# Patient Record
Sex: FEMALE | Race: BLACK OR AFRICAN AMERICAN | Employment: FULL TIME | URBAN - METROPOLITAN AREA
[De-identification: names, ages, dates, MRNs, and addresses within clinical notes are randomized per-mention and may not be internally consistent; named-entity substitution may affect disease eponyms.]

---

## 2017-12-04 ENCOUNTER — HOSPITAL ENCOUNTER (EMERGENCY)
Age: 27
Discharge: HOME | End: 2017-12-04
Attending: EMERGENCY MEDICINE
Payer: COMMERCIAL

## 2017-12-04 VITALS
OXYGEN SATURATION: 100 % | RESPIRATION RATE: 20 BRPM | HEIGHT: 67 IN | DIASTOLIC BLOOD PRESSURE: 72 MMHG | BODY MASS INDEX: 21.19 KG/M2 | WEIGHT: 135 LBS | HEART RATE: 94 BPM | SYSTOLIC BLOOD PRESSURE: 93 MMHG | TEMPERATURE: 98.6 F

## 2017-12-04 DIAGNOSIS — G43.709 CHRONIC MIGRAINE WITHOUT AURA WITHOUT STATUS MIGRAINOSUS, NOT INTRACTABLE: Primary | ICD-10-CM

## 2017-12-04 PROCEDURE — 99283 EMERGENCY DEPT VISIT LOW MDM: CPT

## 2017-12-04 PROCEDURE — 74011250637 HC RX REV CODE- 250/637: Performed by: EMERGENCY MEDICINE

## 2017-12-04 RX ORDER — PROMETHAZINE HYDROCHLORIDE 25 MG/1
25 TABLET ORAL
Status: COMPLETED | OUTPATIENT
Start: 2017-12-04 | End: 2017-12-04

## 2017-12-04 RX ORDER — ONDANSETRON 4 MG/1
4 TABLET, ORALLY DISINTEGRATING ORAL
Status: COMPLETED | OUTPATIENT
Start: 2017-12-04 | End: 2017-12-04

## 2017-12-04 RX ORDER — BUTALBITAL, ACETAMINOPHEN AND CAFFEINE 300; 40; 50 MG/1; MG/1; MG/1
1 CAPSULE ORAL
Qty: 20 CAP | Refills: 0 | Status: SHIPPED | OUTPATIENT
Start: 2017-12-04 | End: 2018-01-05

## 2017-12-04 RX ORDER — PROMETHAZINE HYDROCHLORIDE 25 MG/1
25 TABLET ORAL
Qty: 12 TAB | Refills: 0 | Status: SHIPPED | OUTPATIENT
Start: 2017-12-04 | End: 2018-01-05

## 2017-12-04 RX ORDER — BUTALBITAL, ACETAMINOPHEN AND CAFFEINE 50; 325; 40 MG/1; MG/1; MG/1
1 TABLET ORAL
Status: COMPLETED | OUTPATIENT
Start: 2017-12-04 | End: 2017-12-04

## 2017-12-04 RX ORDER — ONDANSETRON 4 MG/1
4 TABLET, ORALLY DISINTEGRATING ORAL
Qty: 10 TAB | Refills: 0 | Status: SHIPPED | OUTPATIENT
Start: 2017-12-04 | End: 2018-01-05

## 2017-12-04 RX ADMIN — ONDANSETRON 4 MG: 4 TABLET, ORALLY DISINTEGRATING ORAL at 21:34

## 2017-12-04 RX ADMIN — PROMETHAZINE HYDROCHLORIDE 25 MG: 25 TABLET ORAL at 22:05

## 2017-12-04 RX ADMIN — BUTALBITAL, ACETAMINOPHEN, AND CAFFEINE 1 TABLET: 50; 325; 40 TABLET ORAL at 21:34

## 2017-12-05 NOTE — ED NOTES
Pt presents ambulatory to ED complaining of migraine and nausea. Pt reports hx of migraines and previous rx for Fioricet. Pt is alert and oriented x 4, RR even and unlabored, skin is warm and dry. Assesment completed and pt updated on plan of care. Emergency Department Nursing Plan of Care       The Nursing Plan of Care is developed from the Nursing assessment and Emergency Department Attending provider initial evaluation. The plan of care may be reviewed in the ED Provider note.     The Plan of Care was developed with the following considerations:   Patient / Family readiness to learn indicated by:verbalized understanding  Persons(s) to be included in education: patient  Barriers to Learning/Limitations:No    Signed     Jane Kessler RN    12/4/2017   9:37 PM

## 2017-12-05 NOTE — ED PROVIDER NOTES
EMERGENCY DEPARTMENT HISTORY AND PHYSICAL EXAM      Date: 12/4/2017  Patient Name: Garrison Weinstein    History of Presenting Illness     Chief Complaint   Patient presents with    Headache     Headache x 1 day. Patient says she took aleve and excedrin but recieved no relief. History Provided By: Patient    HPI: Garrison Weinstein, 27 y.o. female with PMHx significant for migraines, presents ambulatory to the ED with cc of severe HA since last night. She also reports decreased appetite, nausea and photophobia. Current HA is similar to previous migraines. Pt notes that she used to have a prescription for Fioricet but ran out ~ 1 year ago. She has tried taking aleve without relief. She reports an allergy to amoxicillin. She denies chance of pregnancy and notes her LMP was ~ 1 week ago. She denies any fevers, chills, vomiting, diarrhea, CP, SOB, vision changes or hearing changes. PCP: Srini San MD    There are no other complaints, changes, or physical findings at this time. Current Facility-Administered Medications   Medication Dose Route Frequency Provider Last Rate Last Dose    butalbital-acetaminophen-caffeine (FIORICET, ESGIC) -40 mg per tablet 1 Tab  1 Tab Oral NOW Terry Rosado MD        ondansetron (ZOFRAN ODT) tablet 4 mg  4 mg Oral NOW Terry Rosado MD           Past History     Past Medical History:  Past Medical History:   Diagnosis Date    Common migraine 4/14       Past Surgical History:  No past surgical history on file. Family History:  Family History   Problem Relation Age of Onset    Hypertension Mother     Thyroid Disease Mother     Diabetes Maternal Grandmother        Social History:  Social History   Substance Use Topics    Smoking status: Never Smoker    Smokeless tobacco: Not on file    Alcohol use Yes      Comment: 2/wine       Allergies:   Allergies   Allergen Reactions    Amoxicillin Hives         Review of Systems   Review of Systems Constitutional: Negative. Negative for activity change, chills and diaphoresis. HENT: Negative. Negative for ear pain, trouble swallowing and voice change. Eyes: Positive for photophobia. Respiratory: Negative for chest tightness and shortness of breath. Cardiovascular: Negative for chest pain, palpitations and leg swelling. Gastrointestinal: Positive for nausea. Negative for constipation and vomiting. Endocrine: Negative. Genitourinary: Negative. Negative for flank pain, frequency and hematuria. Musculoskeletal: Negative. Skin: Negative. Allergic/Immunologic: Negative. Neurological: Positive for headaches. Hematological: Negative. Psychiatric/Behavioral: Negative. All other systems reviewed and are negative. Physical Exam   Physical Exam   Constitutional: She is oriented to person, place, and time. She appears well-developed and well-nourished. HENT:   Head: Normocephalic. Mouth/Throat: Oropharynx is clear and moist.   Eyes: Conjunctivae and EOM are normal. Pupils are equal, round, and reactive to light. Neck: Normal range of motion. Neck supple. Cardiovascular: Normal rate, regular rhythm, normal heart sounds and intact distal pulses. Pulmonary/Chest: Effort normal and breath sounds normal.   Abdominal: Soft. Bowel sounds are normal. She exhibits no distension. There is no rebound. Musculoskeletal: Normal range of motion. She exhibits no edema or deformity. Neurological: She is alert and oriented to person, place, and time. Skin: Skin is warm and dry. Psychiatric: She has a normal mood and affect. Her behavior is normal. Judgment and thought content normal.           Medical Decision Making   I am the first provider for this patient. I reviewed the vital signs, available nursing notes, past medical history, past surgical history, family history and social history. Vital Signs-Reviewed the patient's vital signs.   Patient Vitals for the past 12 hrs:   Temp Pulse Resp BP SpO2   12/04/17 2115 98.6 °F (37 °C) 94 20 93/72 100 %       Pulse Oximetry Analysis - 100% on RA        Records Reviewed: Nursing Notes and Old Medical Records    Provider Notes (Medical Decision Making):     DDx: migraine HA, tension HA, sinus HA    ED Course:   Initial assessment performed. The patients presenting problems have been discussed, and they are in agreement with the care plan formulated and outlined with them. I have encouraged them to ask questions as they arise throughout their visit. 10:27 PM  Pt is feeling better, will discharge. Disposition:    Discharge Note:  10:30 PM  The pt is ready for discharge. The pt's signs, symptoms, diagnosis, and discharge instructions have been discussed and pt has conveyed their understanding. The pt is to follow up as recommended or return to ER should their symptoms worsen. Plan has been discussed and pt is in agreement. This note is prepared by Ralph Alanis, acting as a Scribe for James Molina MD.    James Molina MD: The scribe's documentation has been prepared under my direction and personally reviewed by me in its entirety. I confirm that the notes above accurately reflects all work, treatment, procedures, and medical decision making performed by me. PLAN:  1. Current Discharge Medication List      START taking these medications    Details   butalbital-acetaminophen-caff (FIORICET) -40 mg per capsule Take 1 Cap by mouth every four (4) hours as needed for Pain. Max Daily Amount: 6 Caps. Qty: 20 Cap, Refills: 0      ondansetron (ZOFRAN ODT) 4 mg disintegrating tablet Take 1 Tab by mouth every eight (8) hours as needed for Nausea. Qty: 10 Tab, Refills: 0      promethazine (PHENERGAN) 25 mg tablet Take 1 Tab by mouth every six (6) hours as needed for Nausea. Qty: 12 Tab, Refills: 0           2.    Follow-up Information     Follow up With Details Comments [address] MD Call  [phone]  [phone]      [facility] [place] EMERGENCY DEPT  As needed, If symptoms worsen [address]        Return to ED if worse     Diagnosis     Clinical Impression:   1. Chronic migraine without aura without status migrainosus, not intractable        Attestations:    Attestations: This note is prepared by Ralph Alanis, acting as Scribe for MD James Duenas MD: The scribe's documentation has been prepared under my direction and personally reviewed by me in its entirety. I confirm that the note above accurately reflects all work, treatment, procedures, and medical decision making performed by me.

## 2017-12-05 NOTE — ED NOTES
Discharge instructions were given to the patient by Juma Brooks RN. The patient left the Emergency Department ambulatory, alert and oriented and in no acute distress with 3 prescriptions. The patient was encouraged to call or return to the ED for worsening issues or problems and was encouraged to schedule a follow up appointment for continuing care. The patient verbalized understanding of discharge instructions and prescriptions, all questions were answered. The patient has no further concerns at this time.

## 2017-12-05 NOTE — DISCHARGE INSTRUCTIONS
Migraine Headache: Care Instructions  Your Care Instructions  Migraines are painful, throbbing headaches that often start on one side of the head. They may cause nausea and vomiting and make you sensitive to light, sound, or smell. Without treatment, migraines can last from 4 hours to a few days. Medicines can help prevent migraines or stop them after they have started. Your doctor can help you find which ones work best for you. Follow-up care is a key part of your treatment and safety. Be sure to make and go to all appointments, and call your doctor if you are having problems. It's also a good idea to know your test results and keep a list of the medicines you take. How can you care for yourself at home? · Do not drive if you have taken a prescription pain medicine. · Rest in a quiet, dark room until your headache is gone. Close your eyes, and try to relax or go to sleep. Don't watch TV or read. · Put a cold, moist cloth or cold pack on the painful area for 10 to 20 minutes at a time. Put a thin cloth between the cold pack and your skin. · Use a warm, moist towel or a heating pad set on low to relax tight shoulder and neck muscles. · Have someone gently massage your neck and shoulders. · Take your medicines exactly as prescribed. Call your doctor if you think you are having a problem with your medicine. You will get more details on the specific medicines your doctor prescribes. · Be careful not to take pain medicine more often than the instructions allow. You could get worse or more frequent headaches when the medicine wears off. To prevent migraines  · Keep a headache diary so you can figure out what triggers your headaches. Avoiding triggers may help you prevent headaches. Record when each headache began, how long it lasted, and what the pain was like.  (Was it throbbing, aching, stabbing, or dull?) Write down any other symptoms you had with the headache, such as nausea, flashing lights or dark spots, or sensitivity to bright light or loud noise. Note if the headache occurred near your period. List anything that might have triggered the headache. Triggers may include certain foods (chocolate, cheese, wine) or odors, smoke, bright light, stress, or lack of sleep. · If your doctor has prescribed medicine for your migraines, take it as directed. You may have medicine that you take only when you get a migraine and medicine that you take all the time to help prevent migraines. ¨ If your doctor has prescribed medicine for when you get a headache, take it at the first sign of a migraine, unless your doctor has given you other instructions. ¨ If your doctor has prescribed medicine to prevent migraines, take it exactly as prescribed. Call your doctor if you think you are having a problem with your medicine. · Find healthy ways to deal with stress. Migraines are most common during or right after stressful times. Take time to relax before and after you do something that has caused a migraine in the past.  · Try to keep your muscles relaxed by keeping good posture. Check your jaw, face, neck, and shoulder muscles for tension. Try to relax them. When you sit at a desk, change positions often. And make sure to stretch for 30 seconds each hour. · Get plenty of sleep and exercise. · Eat meals on a regular schedule. Avoid foods and drinks that often trigger migraines. These include chocolate, alcohol (especially red wine and port), aspartame, monosodium glutamate (MSG), and some additives found in foods (such as hot dogs, cantu, cold cuts, aged cheeses, and pickled foods). · Limit caffeine. Don't drink too much coffee, tea, or soda. But don't quit caffeine suddenly. That can also give you migraines. · Do not smoke or allow others to smoke around you. If you need help quitting, talk to your doctor about stop-smoking programs and medicines. These can increase your chances of quitting for good.   · If you are taking birth control pills or hormone therapy, talk to your doctor about whether they are triggering your migraines. When should you call for help? Call 911 anytime you think you may need emergency care. For example, call if:  ? · You have signs of a stroke. These may include:  ¨ Sudden numbness, paralysis, or weakness in your face, arm, or leg, especially on only one side of your body. ¨ Sudden vision changes. ¨ Sudden trouble speaking. ¨ Sudden confusion or trouble understanding simple statements. ¨ Sudden problems with walking or balance. ¨ A sudden, severe headache that is different from past headaches. ?Call your doctor now or seek immediate medical care if:  ? · You have new or worse nausea and vomiting. ? · You have a new or higher fever. ? · Your headache gets much worse. ? Watch closely for changes in your health, and be sure to contact your doctor if:  ? · You are not getting better after 2 days (48 hours). Where can you learn more? Go to http://nelsy-ivonne.info/. Enter C700 in the search box to learn more about \"Migraine Headache: Care Instructions. \"  Current as of: October 14, 2016  Content Version: 11.4  © 7623-4840 [employer]. Care instructions adapted under license by [employer] (which disclaims liability or warranty for this information). If you have questions about a medical condition or this instruction, always ask your healthcare professional. [place] [address] any warranty or liability for your use of this information.

## 2018-01-05 ENCOUNTER — OFFICE VISIT (OUTPATIENT)
Dept: NEUROLOGY | Age: 28
End: 2018-01-05

## 2018-01-05 VITALS
WEIGHT: 142 LBS | DIASTOLIC BLOOD PRESSURE: 68 MMHG | BODY MASS INDEX: 22.29 KG/M2 | RESPIRATION RATE: 20 BRPM | SYSTOLIC BLOOD PRESSURE: 98 MMHG | HEIGHT: 67 IN

## 2018-01-05 DIAGNOSIS — G43.001 MIGRAINE WITHOUT AURA AND WITH STATUS MIGRAINOSUS, NOT INTRACTABLE: Primary | ICD-10-CM

## 2018-01-05 RX ORDER — SUMATRIPTAN AND NAPROXEN SODIUM 85; 500 MG/1; MG/1
TABLET, FILM COATED ORAL
Qty: 2 TAB | Refills: 0 | Status: CANCELLED | COMMUNITY
Start: 2018-01-05

## 2018-01-05 RX ORDER — NAPROXEN SODIUM 220 MG
220 TABLET ORAL 2 TIMES DAILY WITH MEALS
COMMUNITY

## 2018-01-05 RX ORDER — SUMATRIPTAN AND NAPROXEN SODIUM 85; 500 MG/1; MG/1
TABLET, FILM COATED ORAL
Qty: 9 TAB | Refills: 3 | Status: SHIPPED | OUTPATIENT
Start: 2018-01-05 | End: 2021-11-08

## 2018-01-05 NOTE — PATIENT INSTRUCTIONS
Learning About Living Tiki  What is a living will? A living will is a legal form you use to write down the kind of care you want at the end of your life. It is used by the health professionals who will treat you if you aren't able to decide for yourself. If you put your wishes in writing, your loved ones and others will know what kind of care you want. They won't need to guess. This can ease your mind and be helpful to others. A living will is not the same as an estate or property will. An estate will explains what you want to happen with your money and property after you die. Is a living will a legal document? A living will is a legal document. Each state has its own laws about living falk. If you move to another state, make sure that your living will is legal in the state where you now live. Or you might use a universal form that has been approved by many states. This kind of form can sometimes be completed and stored online. Your electronic copy will then be available wherever you have a connection to the Internet. In most cases, doctors will respect your wishes even if you have a form from a different state. · You don't need an  to complete a living will. But legal advice can be helpful if your state's laws are unclear, your health history is complicated, or your family can't agree on what should be in your living will. · You can change your living will at any time. Some people find that their wishes about end-of-life care change as their health changes. · In addition to making a living will, think about completing a medical power of  form. This form lets you name the person you want to make end-of-life treatment decisions for you (your \"health care agent\") if you're not able to. Many hospitals and nursing homes will give you the forms you need to complete a living will and a medical power of .   · Your living will is used only if you can't make or communicate decisions for yourself anymore. If you become able to make decisions again, you can accept or refuse any treatment, no matter what you wrote in your living will. · Your state may offer an online registry. This is a place where you can store your living will online so the doctors and nurses who need to treat you can find it right away. What should you think about when creating a living will? Talk about your end-of-life wishes with your family members and your doctor. Let them know what you want. That way the people making decisions for you won't be surprised by your choices. Think about these questions as you make your living will:  · Do you know enough about life support methods that might be used? If not, talk to your doctor so you know what might be done if you can't breathe on your own, your heart stops, or you're unable to swallow. · What things would you still want to be able to do after you receive life-support methods? Would you want to be able to walk? To speak? To eat on your own? To live without the help of machines? · If you have a choice, where do you want to be cared for? In your home? At a hospital or nursing home? · Do you want certain Anglican practices performed if you become very ill? · If you have a choice at the end of your life, where would you prefer to die? At home? In a hospital or nursing home? Somewhere else? · Would you prefer to be buried or cremated? · Do you want your organs to be donated after you die? What should you do with your living will? · Make sure that your family members and your health care agent have copies of your living will. · Give your doctor a copy of your living will to keep in your medical record. If you have more than one doctor, make sure that each one has a copy. · You may want to put a copy of your living will where it can be easily found. Where can you learn more? Go to http://nelsy-ivonne.info/.   Enter X946 in the search box to learn more about \"Learning About Living Radha Hughes. \"  Current as of: September 24, 2016  Content Version: 11.4  © 1146-0506 [employer]. Care instructions adapted under license by [employer] (which disclaims liability or warranty for this information). If you have questions about a medical condition or this instruction, always ask your healthcare professional. [address] any warranty or liability for your use of this information. Advance Directives: Care Instructions  Your Care Instructions  An advance directive is a legal way to state your wishes at the end of your life. It tells your family and your doctor what to do if you can no longer say what you want. There are two main types of advance directives. You can change them any time that your wishes change. · A living will tells your family and your doctor your wishes about life support and other treatment. · A durable power of  for health care lets you name a person to make treatment decisions for you when you can't speak for yourself. This person is called a health care agent. If you do not have an advance directive, decisions about your medical care may be made by a doctor or a  who doesn't know you. It may help to think of an advance directive as a gift to the people who care for you. If you have one, they won't have to make tough decisions by themselves. Follow-up care is a key part of your treatment and safety. Be sure to make and go to all appointments, and call your doctor if you are having problems. It's also a good idea to know your test results and keep a list of the medicines you take. How can you care for yourself at home? · Discuss your wishes with your loved ones and your doctor. This way, there are no surprises. · Many states have a unique form. Or you might use a universal form that has been approved by many states. This kind of form can sometimes be completed and stored online.  Your electronic copy will then be available wherever you have a connection to the Internet. In most cases, doctors will respect your wishes even if you have a form from a different state. · You don't need a  to do an advance directive. But you may want to get legal advice. · Think about these questions when you prepare an advance directive:  ¨ Who do you want to make decisions about your medical care if you are not able to? Many people choose a family member or close friend. ¨ Do you know enough about life support methods that might be used? If not, talk to your doctor so you understand. ¨ What are you most afraid of that might happen? You might be afraid of having pain, losing your independence, or being kept alive by machines. ¨ Where would you prefer to die? Choices include your home, a hospital, or a nursing home. ¨ Would you like to have information about hospice care to support you and your family? ¨ Do you want to donate organs when you die? ¨ Do you want certain Anabaptist practices performed before you die? If so, put your wishes in the advance directive. · Read your advance directive every year, and make changes as needed. When should you call for help? Be sure to contact your doctor if you have any questions. Where can you learn more? Go to http://nelsy-ivonne.info/. Enter R264 in the search box to learn more about \"Advance Directives: Care Instructions. \"  Current as of: September 24, 2016  Content Version: 11.4  © 6663-3280 [employer]. Care instructions adapted under license by [employer] (which disclaims liability or warranty for this information). If you have questions about a medical condition or this instruction, always ask your healthcare professional. [place] [address] any warranty or liability for your use of this information.   [facility]   Statement to Patients  April 1, 2014      In an effort to ensure the large volume of patient prescription refills is processed in the most efficient and expeditious manner, we are asking our patients to assist us by calling your Pharmacy for all prescription refills, this will include also your  Mail Order Pharmacy. The pharmacy will contact our office electronically to continue the refill process. Please do not wait until the last minute to call your pharmacy. We need at least 48 hours (2days) to fill prescriptions. We also encourage you to call your pharmacy before going to  your prescription to make sure it is ready. With regard to controlled substance prescription refill requests (narcotic refills) that need to be picked up at our office, we ask your cooperation by providing us with at least 72 hours (3days) notice that you will need a refill. We will not refill narcotic prescription refill requests after 4:00pm on any weekday, Monday through Thursday, or after 2:00pm on Fridays, or on the weekends. We encourage everyone to explore another way of getting your prescription refill request processed using [employer], our patient web portal through our electronic medical record system. [employer] is an efficient and effective way to communicate your medication request directly to the office and  downloadable as an trever on your smart phone . [employer] also features a review functionality that allows you to view your medication list as well as leave messages for your physician. Are you ready to get connected? If so please review the attatched instructions or speak to any of our staff to get you set up right away! Thank you so much for your cooperation. Should you have any questions please contact our Practice Administrator. The Physicians and Staff,  [facility]       Track headaches on a calendar and bring to each appointment. Use Treximet for acute HA.  1 at HA onset and repeat in 2 hours x1 if needed. Maximum 2 in 24 hours

## 2018-01-05 NOTE — MR AVS SNAPSHOT
Visit Information Date & Time Provider Department Dept. Phone Encounter #  
 1/5/2018 10:30 AM Alex Morales MD [facility] [phone] [id number] Follow-up Instructions Return in about 2 months (around 3/5/2018). Upcoming Health Maintenance Date Due  
 HPV AGE 9Y-34Y (1 of 3 - Female 3 Dose Series) 12/19/2003 DTaP/Tdap/Td series (1 - Tdap) 12/19/2013 PAP AKA CERVICAL CYTOLOGY 12/19/2013 Influenza Age 5 to Adult 8/1/2017 Allergies as of 1/5/2018  Review Complete On: 1/5/2018 By: Alex Morales MD  
  
 Severity Noted Reaction Type Reactions Amoxicillin  04/17/2014   Topical Hives Current Immunizations  Never Reviewed No immunizations on file. Not reviewed this visit Vitals BP Resp Height(growth percentile) Weight(growth percentile) BMI OB Status 98/68 20 5' 7\" (1.702 m) 142 lb (64.4 kg) 22.24 kg/m2 Having regular periods Smoking Status Never Smoker Vitals History BMI and BSA Data Body Mass Index Body Surface Area  
 22.24 kg/m 2 1.74 m 2 Preferred Pharmacy Pharmacy Name Phone [address] [facility] [address], [address] [facility] [phone] Your Updated Medication List  
  
   
This list is accurate as of: 1/5/18 10:54 AM.  Always use your most recent med list.  
  
  
  
  
 ADDERALL XR PO Take 15 mg by mouth daily. ALEVE 220 mg tablet Generic drug:  naproxen sodium Take 220 mg by mouth two (2) times daily (with meals). aspirin-acetaminophen-caffeine 250-250-65 mg per tablet Commonly known as:  EXCEDRIN ES Take 1 Tab by mouth. SINGULAIR PO Take  by mouth. SUMAtriptan-naproxen  mg tablet Commonly known as:  TREXIMET Take one tablet at headache onset and repeat in 2 hours x1 prn  
  
  
  
  
Prescriptions Sent to Pharmacy Refills SUMAtriptan-naproxen (TREXIMET)  mg tablet 3 Sig: Take one tablet at headache onset and repeat in 2 hours x1 prn Class: Normal  
 Pharmacy: [address] [id number] [facility] #: [phone] Follow-up Instructions Return in about 2 months (around 3/5/2018). Patient Instructions Ruben Aguiar [id number] What is a living will? A living will is a legal form you use to write down the kind of care you want at the end of your life. It is used by the health professionals who will treat you if you aren't able to decide for yourself. If you put your wishes in writing, your loved ones and others will know what kind of care you want. They won't need to guess. This can ease your mind and be helpful to others. A living will is not the same as an estate or property will. An estate will explains what you want to happen with your money and property after you die. Is a living will a legal document? A living will is a legal document. Each state has its own laws about living falk. If you move to another state, make sure that your living will is legal in the state where you now live. Or you might use a universal form that has been approved by many states. This kind of form can sometimes be completed and stored online. Your electronic copy will then be available wherever you have a connection to the Internet. In most cases, doctors will respect your wishes even if you have a form from a different state. · You don't need an  to complete a living will. But legal advice can be helpful if your state's laws are unclear, your health history is complicated, or your family can't agree on what should be in your living will. · You can change your living will at any time. Some people find that their wishes about end-of-life care change as their health changes. · In addition to making a living will, think about completing a medical power of  form. This form lets you name the person you want to make end-of-life treatment decisions for you (your \"health care agent\") if you're not able to. Many hospitals and nursing homes will give you the forms you need to complete a living will and a medical power of . · Your living will is used only if you can't make or communicate decisions for yourself anymore. If you become able to make decisions again, you can accept or refuse any treatment, no matter what you wrote in your living will. · Your state may offer an online registry. This is a place where you can store your living will online so the doctors and nurses who need to treat you can find it right away. What should you think about when creating a living will? Talk about your end-of-life wishes with your family members and your doctor. Let them know what you want. That way the people making decisions for you won't be surprised by your choices. Think about these questions as you make your living will: · Do you know enough about life support methods that might be used? If not, talk to your doctor so you know what might be done if you can't breathe on your own, your heart stops, or you're unable to swallow. · What things would you still want to be able to do after you receive life-support methods? Would you want to be able to walk? To speak? To eat on your own? To live without the help of machines? · If you have a choice, where do you want to be cared for? In your home? At a hospital or nursing home? · Do you want certain Yarsani practices performed if you become very ill? · If you have a choice at the end of your life, where would you prefer to die? At home? In a hospital or nursing home? Somewhere else? · Would you prefer to be buried or cremated? · Do you want your organs to be donated after you die? What should you do with your living will? · Make sure that your family members and your health care agent have copies of your living will. · Give your doctor a copy of your living will to keep in your medical record. If you have more than one doctor, make sure that each one has a copy. · You may want to put a copy of your living will where it can be easily found. Where can you learn more? Go to http://nelsy-ivonne.info/. Enter Y017 in the search box to learn more about \"Learning About Living Shima Stinson. \" Current as of: September 24, 2016 Content Version: 11.4 © 5074-7050 Expedit[employer]us. Care instructions adapted under license by [employer] (which disclaims liability or warranty for this information). If you have questions about a medical condition or this instruction, always ask your healthcare professional. [address] any warranty or liability for your use of this information. Advance Directives: Care Instructions Your Care Instructions An advance directive is a legal way to state your wishes at the end of your life. It tells your family and your doctor what to do if you can no longer say what you want. There are two main types of advance directives. You can change them any time that your wishes change. · A living will tells your family and your doctor your wishes about life support and other treatment. · A durable power of  for health care lets you name a person to make treatment decisions for you when you can't speak for yourself. This person is called a health care agent. If you do not have an advance directive, decisions about your medical care may be made by a doctor or a  who doesn't know you. It may help to think of an advance directive as a gift to the people who care for you. If you have one, they won't have to make tough decisions by themselves. Follow-up care is a key part of your treatment and safety.  Be sure to make and go to all appointments, and call your doctor if you are having problems. It's also a good idea to know your test results and keep a list of the medicines you take. How can you care for yourself at home? · Discuss your wishes with your loved ones and your doctor. This way, there are no surprises. · Many states have a unique form. Or you might use a universal form that has been approved by many states. This kind of form can sometimes be completed and stored online. Your electronic copy will then be available wherever you have a connection to the Internet. In most cases, doctors will respect your wishes even if you have a form from a different state. · You don't need a  to do an advance directive. But you may want to get legal advice. · Think about these questions when you prepare an advance directive: ¨ Who do you want to make decisions about your medical care if you are not able to? Many people choose a family member or close friend. ¨ Do you know enough about life support methods that might be used? If not, talk to your doctor so you understand. ¨ What are you most afraid of that might happen? You might be afraid of having pain, losing your independence, or being kept alive by machines. ¨ Where would you prefer to die? Choices include your home, a hospital, or a nursing home. ¨ Would you like to have information about hospice care to support you and your family? ¨ Do you want to donate organs when you die? ¨ Do you want certain Holiness practices performed before you die? If so, put your wishes in the advance directive. · Read your advance directive every year, and make changes as needed. When should you call for help? Be sure to contact your doctor if you have any questions. Where can you learn more? Go to http://nelsy-ivonne.info/. Enter R264 in the search box to learn more about \"Advance Directives: Care Instructions. \" Current as of: September 24, 2016 Content Version: 11.4 © 7532-7631 [employer]. Care instructions adapted under license by [employer] (which disclaims liability or warranty for this information). If you have questions about a medical condition or this instruction, always ask your healthcare professional. [address] any warranty or liability for your use of this information. PRESCRIPTION REFILL POLICY [facility] Statement to Patients April 1, 2014 In an effort to ensure the large volume of patient prescription refills is processed in the most efficient and expeditious manner, we are asking our patients to assist us by calling your Pharmacy for all prescription refills, this will include also your  Mail Order Pharmacy. The pharmacy will contact our office electronically to continue the refill process. Please do not wait until the last minute to call your pharmacy. We need at least 48 hours (2days) to fill prescriptions. We also encourage you to call your pharmacy before going to  your prescription to make sure it is ready. With regard to controlled substance prescription refill requests (narcotic refills) that need to be picked up at our office, we ask your cooperation by providing us with at least 72 hours (3days) notice that you will need a refill. We will not refill narcotic prescription refill requests after 4:00pm on any weekday, Monday through Thursday, or after 2:00pm on Fridays, or on the weekends. We encourage everyone to explore another way of getting your prescription refill request processed using [employer], our patient web portal through our electronic medical record system. [employer] is an efficient and effective way to communicate your medication request directly to the office and  downloadable as an trever on your smart phone .  [employer] also features a review functionality that allows you to view your medication list as well as leave messages for your physician. Are you ready to get connected? If so please review the attatched instructions or speak to any of our staff to get you set up right away! Thank you so much for your cooperation. Should you have any questions please contact our Practice Administrator. The Physicians and Staff,  [facility] Track headaches on a calendar and bring to each appointment. Use Treximet for acute HA.  1 at HA onset and repeat in 2 hours x1 if needed. Maximum 2 in 24 hours Introducing [address]! Gigibridget Cecy introduces [employer] patient portal. Now you can access parts of your medical record, email your doctor's office, and request medication refills online. 1. In your internet browser, go to [email or web address] 2. Click on the First Time User? Click Here link in the Sign In box. You will see the New Member Sign Up page. 3. Enter your [employer] Access Code exactly as it appears below. You will not need to use this code after youve completed the sign-up process. If you do not sign up before the expiration date, you must request a new code. · [employer] Access Code: [email or web address] Expires: 3/4/2018  9:36 PM 
 
4. Enter the last four digits of your Social Security Number (xxxx) and Date of Birth (mm/dd/yyyy) as indicated and click Submit. You will be taken to the next sign-up page. 5. Create a [employer] ID. This will be your [employer] login ID and cannot be changed, so think of one that is secure and easy to remember. 6. Create a [employer] password. You can change your password at any time. 7. Enter your Password Reset Question and Answer. This can be used at a later time if you forget your password. 8. Enter your e-mail address. You will receive e-mail notification when new information is available in [address] 9. Click Sign Up. You can now view and download portions of your medical record. 10. Click the Download Summary menu link to download a portable copy of your medical information. If you have questions, please visit the Frequently Asked Questions section of the [employer] website. Remember, [employer] is NOT to be used for urgent needs. For medical emergencies, dial 911. Now available from your iPhone and Android! Please provide this summary of care documentation to your next provider. Your primary care clinician is listed as Dominic Romero. If you have any questions after today's visit, please call [phone].

## 2018-01-05 NOTE — PROGRESS NOTES
[address] [facility]   [place] [address]   [place] [address]    [facility]    Fax             Referring: Kathy Geller MD      Chief Complaint   Patient presents with    Headache     3 times a week lasting 24 hrs for one month     79-year-old right-handed woman who presents unaccompanied today for evaluation of what she calls increase in throbbing head pain sensitivity to light noises and nausea. She had a recent visit to the emergency department at [facility].  She was evaluated there and discharged with Phenergan and Zofran and Fioricet. Patient tells me that she has been having headaches since the seventh grade or so. At that point she did see a neurologist and she was told that she had migraine headaches. She says on average she has 1-2 headaches per month. The last month however she has had more frequent headaches noting that she is getting headache every other day or so. They can last for days she says. She says an average duration actually is several days. She does have a prodrome that she is getting a headache but does not have a true aura. She says headaches are located in the bitemporal bifrontal region described as throbbing. She has associated photophobia phonophobia and nausea but typically does not vomit. No known triggers. Her mother has a history of migraine. She has no focal deficits with a headache or surrounding headache. No loss of consciousness. No loss of vision. No palpitations chest pain. No fever chills. No recent injury. No changes in medication recently. No inciting factor to have her headaches increased over the last several weeks. She follows closely with primary care. Her asthma is controlled. She does not use her metered-dose inhaler anymore. Her blood pressures been running on the low side she states. She has never been on a preventative medicine.   She was given Fioricet in the emergency department. She is used over-the-counter medications prior. She has never been given migraine specific medications in the past.      Past Medical History:   Diagnosis Date    Asthma     Common migraine 4/14       No past surgical history on file. Current Outpatient Prescriptions   Medication Sig Dispense Refill    naproxen sodium (ALEVE) 220 mg tablet Take 220 mg by mouth two (2) times daily (with meals).  aspirin-acetaminophen-caffeine (EXCEDRIN ES) 250-250-65 mg per tablet Take 1 Tab by mouth.  MONTELUKAST SODIUM (SINGULAIR PO) Take  by mouth.  DEXTROAMPHETAMINE/AMPHETAMINE (ADDERALL XR PO) Take 15 mg by mouth daily.  butalbital-acetaminophen-caff (FIORICET) -40 mg per capsule Take 1 Cap by mouth every four (4) hours as needed for Pain. Max Daily Amount: 6 Caps. 20 Cap 0    ondansetron (ZOFRAN ODT) 4 mg disintegrating tablet Take 1 Tab by mouth every eight (8) hours as needed for Nausea. 10 Tab 0    promethazine (PHENERGAN) 25 mg tablet Take 1 Tab by mouth every six (6) hours as needed for Nausea. 12 Tab 0        Allergies   Allergen Reactions    Amoxicillin Hives       Social History   Substance Use Topics    Smoking status: Never Smoker    Smokeless tobacco: Never Used    Alcohol use Yes      Comment: 2/wine       Family History   Problem Relation Age of Onset    Hypertension Mother     Thyroid Disease Mother     Headache Mother     Diabetes Maternal Grandmother     Cancer Maternal Grandfather      Review of Systems  Pertinent positives and negatives are as noted above otherwise comprehensive systems review is negative. Examination  Visit Vitals    BP 98/68    Resp 20    Ht 5' 7\" (1.702 m)    Wt 64.4 kg (142 lb)    BMI 22.24 kg/m2     Pleasant, well appearing. No icterus. Oropharynx clear. Supple neck without bruit. Heart regular. No murmur. No edema.       Neurologically, she is awake, alert, and oriented with normal speech and language. Her cognition is normal.    Intact cranial nerves 2-12. No nystagmus. Visual fields full to confrontation. Disk margins are flat bilaterally. She has normal bulk and tone. She has no abnormal movement. She has no pronation or drift. She generates full strength in the upper and lower extremities to direct confrontational testing. Reflexes are symmetrical in the upper and lower extremities bilaterally. Her toes are down bilaterally. No Gambino. Finger nose finger and rapid alternating movements are normal.  Steady gait. She is able to heel, toe, and tandem walk. No sensory deficit to primary modalities. Impression/Plan  22-year-old lady with migraine headaches. Examination is reassuring. History reassuring as well. In general she notes that she has 1-2 headaches per month on average but the last month she has had increase in frequency. Given that we will defer any institution of a preventative medicine at this point. Discussed overt use of medication and gave both oral and written migraine education today. At this juncture we will try to treat the headaches as they come with Treximet. Discussed administration potential side effects potential benefits and alternatives. She will track her headaches and bring that calendar to each appointment. When she returns in 2 months we will use that data to determine whether or not we need to continue just treating on a as needed basis or whether we need to institute a preventative medicine as well. This note was created using voice recognition software. Despite editing, there may be syntax errors. This note will not be viewable in [address]

## 2021-03-30 ENCOUNTER — TRANSCRIBE ORDER (OUTPATIENT)
Dept: SCHEDULING | Age: 31
End: 2021-03-30

## 2021-03-30 DIAGNOSIS — S83.511A NEW ACL TEAR, RIGHT, INITIAL ENCOUNTER: Primary | ICD-10-CM

## 2021-03-31 ENCOUNTER — HOSPITAL ENCOUNTER (OUTPATIENT)
Dept: MRI IMAGING | Age: 31
Discharge: HOME | End: 2021-03-31
Attending: ORTHOPAEDIC SURGERY
Payer: COMMERCIAL

## 2021-03-31 DIAGNOSIS — S83.511A NEW ACL TEAR, RIGHT, INITIAL ENCOUNTER: ICD-10-CM

## 2021-03-31 PROCEDURE — 73721 MRI JNT OF LWR EXTRE W/O DYE: CPT

## 2021-05-07 ENCOUNTER — HOSPITAL ENCOUNTER (OUTPATIENT)
Dept: ULTRASOUND IMAGING | Age: 31
Discharge: HOME | End: 2021-05-07
Attending: ORTHOPAEDIC SURGERY
Payer: COMMERCIAL

## 2021-05-07 ENCOUNTER — TRANSCRIBE ORDER (OUTPATIENT)
Dept: SCHEDULING | Age: 31
End: 2021-05-07

## 2021-05-07 ENCOUNTER — HOSPITAL ENCOUNTER (EMERGENCY)
Age: 31
Discharge: HOME | End: 2021-05-07
Attending: EMERGENCY MEDICINE
Payer: COMMERCIAL

## 2021-05-07 VITALS
OXYGEN SATURATION: 95 % | WEIGHT: 149 LBS | HEIGHT: 66 IN | DIASTOLIC BLOOD PRESSURE: 79 MMHG | HEART RATE: 86 BPM | BODY MASS INDEX: 23.95 KG/M2 | SYSTOLIC BLOOD PRESSURE: 105 MMHG | RESPIRATION RATE: 16 BRPM | TEMPERATURE: 98.5 F

## 2021-05-07 DIAGNOSIS — M79.604 ACUTE LEG PAIN, RIGHT: ICD-10-CM

## 2021-05-07 DIAGNOSIS — M79.89 RIGHT LEG SWELLING: ICD-10-CM

## 2021-05-07 DIAGNOSIS — M79.662 BILATERAL CALF PAIN: ICD-10-CM

## 2021-05-07 DIAGNOSIS — M79.662 BILATERAL CALF PAIN: Primary | ICD-10-CM

## 2021-05-07 DIAGNOSIS — S83.511D RUPTURE OF ANTERIOR CRUCIATE LIGAMENT OF RIGHT KNEE, SUBSEQUENT ENCOUNTER: ICD-10-CM

## 2021-05-07 DIAGNOSIS — M79.661 BILATERAL CALF PAIN: Primary | ICD-10-CM

## 2021-05-07 DIAGNOSIS — I82.4Z1 ACUTE DEEP VEIN THROMBOSIS (DVT) OF DISTAL VEIN OF RIGHT LOWER EXTREMITY (HCC): Primary | ICD-10-CM

## 2021-05-07 DIAGNOSIS — M79.661 BILATERAL CALF PAIN: ICD-10-CM

## 2021-05-07 PROCEDURE — 74011250636 HC RX REV CODE- 250/636: Performed by: EMERGENCY MEDICINE

## 2021-05-07 PROCEDURE — 96372 THER/PROPH/DIAG INJ SC/IM: CPT

## 2021-05-07 PROCEDURE — 93971 EXTREMITY STUDY: CPT

## 2021-05-07 PROCEDURE — 99283 EMERGENCY DEPT VISIT LOW MDM: CPT

## 2021-05-07 RX ORDER — ENOXAPARIN SODIUM 100 MG/ML
1 INJECTION SUBCUTANEOUS
Status: COMPLETED | OUTPATIENT
Start: 2021-05-07 | End: 2021-05-07

## 2021-05-07 RX ADMIN — ENOXAPARIN SODIUM 70 MG: 80 INJECTION SUBCUTANEOUS at 20:49

## 2021-05-08 NOTE — DISCHARGE INSTRUCTIONS
Thank you for allowing us to take care of you today! In this packet, I have included a copy of all your lab results and/or radiologic studies so you can have them easily available at your follow-up visit. We hope we addressed all of your concerns and needs. We strive to provide excellent quality care in the Emergency Department. You will receive a survey after your visit to evaluate the care you were provided. It was a pleasure serving you. Should you receive a survey from us, we invite you to share your experience with us. The exam and treatment you received in the Emergency Department were for an urgent problem and are not intended as complete care. It is important that you follow up with a doctor, nurse practitioner, or physician assistant for ongoing care. If your symptoms become worse or you do not improve as expected and you are unable to reach your usual health care provider, you should return to the Emergency Department. We are available 24 hours a day. Please take your discharge instructions with you when you go to your follow-up appointment. If you have any problem arranging a follow-up appointment, contact the Emergency Department immediately. If a prescription has been provided, please have it filled as soon as possible to prevent a delay in treatment. Read the entire medication instruction sheet provided to you by the pharmacy. If you have any questions or reservations about taking the medication due to side effects or interactions with other medications, please call your primary care physician or contact the ER to speak with the charge nurse. Make an appointment with your family doctor or the physician you were referred to for follow-up of this visit as instructed on your discharge paperwork. Return to the ER if you are unable to be seen or if you are unable to be seen in a timely manner.     If you have any problem arranging the follow-up visit, contact the Emergency Department immediately. I hope you feel better and thank you again for allowing us to provide you with excellent care today at [facility]! Warmest regards,    Juan A Malone MD  Emergency Medicine Physician  [facility]      ______________________________________________________________________________________________    Vitals:    05/07/21 2007   BP: 122/79   BP 1 Location: Left upper arm   BP Patient Position: At rest   Pulse: 98   Resp: 17   Temp: 98.2 °F (36.8 °C)   SpO2: 100%   Weight: 67.6 kg (149 lb)   Height: 5' 6\" (1.676 m)       No results found for this or any previous visit (from the past 12 hour(s)). No orders to display     CT Results  (Last 48 hours)      None          Procedure Technique    Duplex images were obtained using 2-D gray scale, color flow, and spectral Doppler analysis. Important Information    THIS IS A PRELIMINARY RESULT   Vitals    Weight Height BSA (calculated - sq m) BP Pulse (Heart Rate)          Interpretation Summary    Acute occlusive thrombus present in the right posterior tibial vein. Lower Extremity Venous Findings    Right Lower Venous    Acute occlusive thrombus present in the right posterior tibial vein. The common femoral, great saphenous thigh, proximal femoral, mid femoral, distal femoral and popliteal vein(s) were imaged in the transverse and longitudinal planes. The vessels showed normal color filling and compressibility. Doppler interrogation of the veins showed phasic and spontaneous flow.                            Procedure Staff    Technologist/Clinician: Stanley Quinn  Supporting Staff: None  Performing Physician/Midlevel: None     Exam Completion Date/Time: 5/7/21  7:06 PM   PACS Images     Show images for DUPLEX LOWER EXT VENOUS RIGHT

## 2021-05-08 NOTE — ED PROVIDER NOTES
EMERGENCY DEPARTMENT HISTORY AND PHYSICAL EXAM      Please note that this dictation was completed with the assistance of \"Dragon\", the computer voice recognition software. Quite often unanticipated grammatical, syntax, homophones, and other interpretive errors are inadvertently transcribed by the computer software. Please disregard these errors and any errors that have escaped final proofreading. Thank you. Patient Name: Skye Bravo  : 1990  MRN: [id number]  History of Presenting Illness     Chief Complaint   Patient presents with    Abnormal Ultrasound     Pt was getting outpatient ultrasound and was sent here for a DVT in her right calf. Pt had ACL repair last thursday and has been having pain in her leg since . History Provided By: Patient    HPI: Skye Bravo, 31 y.o. female with past medical history as documented below presents to the ED with c/o of 5 days of progressively worsening moderate to severe right calf pain and swelling. She states she had an ACL tear s/p repair done last Thursday by Dr. Tiffany Hastings. She denies any new trauma. No prior hx of DVT. Pt denies any other alleviating or exacerbating factors. Additionally, pt specifically denies any recent fever, chills, headache, nausea, vomiting, abdominal pain, CP, SOB, lightheadedness, dizziness, numbness, weakness, heart palpitations, urinary sxs, diarrhea, constipation, melena, hematochezia, cough, or congestion. There are no other complaints, changes or physical findings pertinent to the HPI at this time.     PCP: Glover Romberg, MD    Past History   Past Medical History:  Past Medical History:   Diagnosis Date    Asthma     Common migraine        Past Surgical History:  Denies    Family History:  Family History   Problem Relation Age of Onset    Hypertension Mother     Thyroid Disease Mother     Headache Mother     Diabetes Maternal Grandmother     Cancer Maternal Grandfather        Social History:  Social History Tobacco Use    Smoking status: Never Smoker    Smokeless tobacco: Never Used   Substance Use Topics    Alcohol use: Yes     Comment: 2/wine    Drug use: No       Allergies: Allergies   Allergen Reactions    Amoxicillin Hives       Current Medications:  No current facility-administered medications on file prior to encounter. Current Outpatient Medications on File Prior to Encounter   Medication Sig Dispense Refill    naproxen sodium (ALEVE) 220 mg tablet Take 220 mg by mouth two (2) times daily (with meals).  aspirin-acetaminophen-caffeine (EXCEDRIN ES) 250-250-65 mg per tablet Take 1 Tab by mouth.  SUMAtriptan-naproxen (TREXIMET)  mg tablet Take one tablet at headache onset and repeat in 2 hours x1 prn 9 Tab 3    MONTELUKAST SODIUM (SINGULAIR PO) Take  by mouth.  DEXTROAMPHETAMINE/AMPHETAMINE (ADDERALL XR PO) Take 15 mg by mouth daily. Review of Systems   Review of Systems   Constitutional: Negative. Negative for chills and fever. HENT: Negative. Negative for congestion and sore throat. Eyes: Negative. Respiratory: Negative. Negative for cough, chest tightness, shortness of breath and wheezing. Cardiovascular: Positive for leg swelling. Negative for chest pain and palpitations. Gastrointestinal: Negative. Negative for abdominal distention, abdominal pain, blood in stool, constipation, diarrhea, nausea and vomiting. Endocrine: Negative. Genitourinary: Negative. Negative for dysuria, flank pain, frequency, hematuria and urgency. Musculoskeletal: Positive for arthralgias. Negative for back pain and myalgias. Skin: Negative. Negative for color change and rash. Neurological: Negative. Negative for dizziness, syncope, speech difficulty, weakness, light-headedness, numbness and headaches. Hematological: Negative. Psychiatric/Behavioral: Negative. Negative for confusion and self-injury. The patient is not nervous/anxious.     All other systems reviewed and are negative. Physical Exam   Physical Exam  Vitals signs and nursing note reviewed. Constitutional:       General: She is not in acute distress. Appearance: She is well-developed. She is not diaphoretic. HENT:      Head: Normocephalic and atraumatic. Mouth/Throat:      Pharynx: No oropharyngeal exudate. Eyes:      Conjunctiva/sclera: Conjunctivae normal.      Pupils: Pupils are equal, round, and reactive to light. Neck:      Musculoskeletal: Normal range of motion. Cardiovascular:      Rate and Rhythm: Normal rate and regular rhythm. Heart sounds: Normal heart sounds. No murmur. No friction rub. No gallop. Pulmonary:      Effort: Pulmonary effort is normal. No respiratory distress. Breath sounds: Normal breath sounds. No wheezing or rales. Chest:      Chest wall: No tenderness. Abdominal:      General: Bowel sounds are normal. There is no distension. Palpations: Abdomen is soft. There is no mass. Tenderness: There is no abdominal tenderness. There is no guarding or rebound. Musculoskeletal: Normal range of motion. General: Tenderness present. No deformity. Right lower leg: Edema present. Comments: Brace to right knee  No skin erythema or warmth  Compartments soft  NVI distally  +TTP right calf, trace pedal edema   Skin:     General: Skin is warm. Findings: No rash. Neurological:      Mental Status: She is alert and oriented to person, place, and time. Cranial Nerves: No cranial nerve deficit. Motor: No abnormal muscle tone. Coordination: Coordination normal.      Deep Tendon Reflexes: Reflexes normal.         Diagnostic Study Results     Labs -   I have personally reviewed and interpreted all available laboratory results. No results found for this or any previous visit (from the past 24 hour(s)).     Radiologic Studies -   I have personally reviewed and interpreted all available imaging studies and agree with radiology interpretation and report. No orders to display     CT Results  (Last 48 hours)    None        CXR Results  (Last 48 hours)    None        Vascular History    DUPLEX LOWER EXT VENOUS RIGHT (Order #[id number]) on 5/7/21   Reason for Exam  Priority: STAT  m79.661   Dx: Bilateral calf pain [M79.661, M79.662 (ICD-10-CM)]   Comments:    Procedure Technique    Duplex images were obtained using 2-D gray scale, color flow, and spectral Doppler analysis. Vitals    Weight Height BSA (calculated - sq m) BP Pulse (Heart Rate)          Interpretation Summary    · Acute occlusive thrombus present in the right posterior tibial vein. Lower Extremity Venous Findings    Right Lower Venous    Acute occlusive thrombus present in the right posterior tibial vein. The common femoral, great saphenous thigh, proximal femoral, mid femoral, distal femoral and popliteal vein(s) were imaged in the transverse and longitudinal planes. The vessels showed normal color filling and compressibility. Doppler interrogation of the veins showed phasic and spontaneous flow. Procedure Staff    Technologist/Clinician: Marques Son  Supporting Staff: None  Performing Physician/Midlevel: None     Exam Completion Date/Time: 5/7/21  7:06 PM       Medical Decision Making   I reviewed the vital signs, available nursing notes, past medical history, past surgical history, family history and social history. Vital Signs-Reviewed the patient's vital signs.   Patient Vitals for the past 24 hrs:   Temp Pulse Resp BP SpO2   05/07/21 2114     95 %   05/07/21 2052 98.5 °F (36.9 °C) 86 16 105/79 95 %   05/07/21 2007 98.2 °F (36.8 °C) 98 17 122/79 100 %       Pulse Oximetry Analysis - 100% on RA    Cardiac Monitor:   Rate: 98 bpm  The cardiac monitor revealed the following rhythm as interpreted by me: Normal Sinus Rhythm       Records Reviewed: Nursing Notes, Old Medical Records, Previous electrocardiograms, Previous Radiology Studies and Previous Laboratory Studies    Provider Notes (Medical Decision Making):   Pt presents with acute right leg pain/swelling after recent trauma and s/p ACL repair surgery. Concern for DVT. No chest pain or SOB. Stable vitals; PMS intact in affected limb. DDx: DVT< lymphedema, muscle strain vs sprain. Will check duplex of RLE. There is no trauma, deformity to warrant x-ray. The leg is not cold to touch, there is strong peripheral pulse, no paralysis or parasthesia, no pallor to suggest compartment syndrome. There are no rashes, excessive warmth, fever, induration of skin to suggest cellulitis/osteo. The pt is motor and sensory intact. Unlikely to be strong or compressed nerve. Will provide symptomatic treatment and reassess. Anticipate discharge home with close Ortho and PCP follow-up. ED Course:   I am the first provider for this patient's ED visit today. Initial assessment performed. I discussed presenting problems, concerns and my formulated plan for today's visit with the patient and any available family members at bedside. I encouraged them to ask questions as they arise throughout the visit. I reviewed our electronic medical record system for any past medical records that were available that may contribute to the patient's current condition, the nursing notes and vital signs from today's visit. ED Orders Placed :  Orders Placed This Encounter    enoxaparin (LOVENOX) injection 70 mg    rivaroxaban (XARELTO STARTER JONN) 15 mg (42)- 20 mg (9) DsPk       ED Medications Administered:  Medications   enoxaparin (LOVENOX) injection 70 mg (70 mg SubCUTAneous Given 5/7/21 2049)        Progress Note:  Patient has been reassessed and reports feeling better and symptoms have improved significantly after ED treatment. Jelani Bruner final labs and imaging have been reviewed with her and available family and/or caregiver. They have been counseled regarding her diagnosis.  She verbally conveys understanding and agreement of the signs, symptoms, diagnosis, treatment and prognosis and additionally agrees to follow up as recommended with Dr. Lashay Blanton MD and/or specialist in 24 - 48 hours. She also agrees with the care-plan we created together and conveys that all of her questions have been answered. I have also put together a packet of discharge instructions for her that include: 1) educational information regarding their diagnosis, 2) how to care for their diagnosis at home, as well a 3) list of reasons why they would want to return to the ED prior to their follow-up appointment should the patient's condition change or symptoms worsen. I have answered all questions to the patient's satisfaction. Strict return precautions given. She both understood and agreed with plan as discussed. Vital signs stable for discharge. Disposition:   DISCHARGE  The pt is ready for discharge. The pt's signs, symptoms, diagnosis, and discharge instructions have been discussed and pt has conveyed their understanding. The pt is to follow up as recommended or return to ER should their symptoms worsen. Plan has been discussed and pt is in agreement. Plan:  1. Return precautions as discussed with patient and available family and/or caregiver. 2.   Discharge Medication List as of 5/7/2021  8:41 PM      START taking these medications    Details   rivaroxaban (XARELTO STARTER JONN) 15 mg (42)- 20 mg (9) DsPk Take one 15 mg tablet twice a day with food for the first 21 days. Then, take one 20 mg tablet once a day with food for 9 days. , Print, Disp-1 Dose Pack, R-0         CONTINUE these medications which have NOT CHANGED    Details   naproxen sodium (ALEVE) 220 mg tablet Take 220 mg by mouth two (2) times daily (with meals). , Historical Med      aspirin-acetaminophen-caffeine (EXCEDRIN ES) 250-250-65 mg per tablet Take 1 Tab by mouth., Historical Med      SUMAtriptan-naproxen (TREXIMET)  mg tablet Take one tablet at headache onset and repeat in 2 hours x1 prn, Normal, Disp-9 Tab, R-3      MONTELUKAST SODIUM (SINGULAIR PO) Take  by mouth., Historical Med      DEXTROAMPHETAMINE/AMPHETAMINE (ADDERALL XR PO) Take 15 mg by mouth daily. , Historical Med           3. Follow-up Information     Follow up With Specialties Details Why Contact Info    [facility] EMERGENCY DEPT Emergency Medicine  As needed, If symptoms worsen [address] [facility]  [phone]    Giulia Bras, DO Orthopedic Surgery  As needed, If symptoms worsen [address] [facility] [address]  [address] [id number]            Instructed to return to ED if worse  Diagnosis   Clinical Impression:  1. Acute deep vein thrombosis (DVT) of distal vein of right lower extremity ([address]    2. Acute leg pain, right    3. Right leg swelling    4. Rupture of anterior cruciate ligament of right knee, subsequent encounter        Attestation:  IGeorge MD, am the attending of record for this patient. I personally performed the services described in this documentation on this date, 5/7/2021 for patientAndrzej. I have reviewed the chart and verified that the record is accurate and complete.

## 2021-07-06 ENCOUNTER — TRANSCRIBE ORDER (OUTPATIENT)
Dept: SCHEDULING | Age: 31
End: 2021-07-06

## 2021-07-06 DIAGNOSIS — I82.421: Primary | ICD-10-CM

## 2021-07-12 ENCOUNTER — HOSPITAL ENCOUNTER (OUTPATIENT)
Dept: ULTRASOUND IMAGING | Age: 31
Discharge: HOME | End: 2021-07-12
Attending: FAMILY MEDICINE
Payer: COMMERCIAL

## 2021-07-12 DIAGNOSIS — I82.421: ICD-10-CM

## 2021-07-12 PROCEDURE — 93971 EXTREMITY STUDY: CPT

## 2021-10-13 VITALS — BODY MASS INDEX: 23.95 KG/M2 | HEIGHT: 66 IN | WEIGHT: 149 LBS

## 2021-10-13 PROBLEM — S83.511D NEW ACL TEAR, RIGHT, SUBSEQUENT ENCOUNTER: Status: ACTIVE | Noted: 2021-10-13

## 2021-11-02 VITALS — HEIGHT: 66 IN | WEIGHT: 149 LBS | BODY MASS INDEX: 23.95 KG/M2

## 2021-11-02 VITALS — BODY MASS INDEX: 23.95 KG/M2 | WEIGHT: 149 LBS | HEIGHT: 66 IN

## 2021-11-02 RX ORDER — NORETHINDRONE ACETATE AND ETHINYL ESTRADIOL 1; .02 MG/1; MG/1
1 TABLET ORAL DAILY
COMMUNITY
End: 2021-11-08

## 2021-11-05 VITALS — BODY MASS INDEX: 23.95 KG/M2 | WEIGHT: 149 LBS | HEIGHT: 66 IN

## 2021-11-08 ENCOUNTER — OFFICE VISIT (OUTPATIENT)
Dept: ORTHOPEDIC SURGERY | Age: 31
End: 2021-11-08
Payer: COMMERCIAL

## 2021-11-08 VITALS — BODY MASS INDEX: 24.75 KG/M2 | HEIGHT: 66 IN | WEIGHT: 154 LBS

## 2021-11-08 DIAGNOSIS — Z98.890 STATUS POST ARTHROSCOPY OF KNEE: Primary | ICD-10-CM

## 2021-11-08 PROCEDURE — 99212 OFFICE O/P EST SF 10 MIN: CPT | Performed by: ORTHOPAEDIC SURGERY

## 2021-11-08 NOTE — PROGRESS NOTES
Erasmo Villafuerte (: 1990) is a 31 y.o. female, established patient, here for evaluation of the following chief complaint(s):  Follow-up (right knee pain)       ASSESSMENT/PLAN:  Below is the assessment and plan developed based on review of pertinent history, physical exam, labs, studies, and medications. Findings were discussed with the patient today. She seems to be progressing well and will continue to progress back into her normal routine and sports as tolerated. I will see her back as needed. 1. Status post arthroscopy of knee      Return if symptoms worsen or fail to improve. SUBJECTIVE/OBJECTIVE:  Erasmo Villafuerte (: 1990) is a 31 y.o. female. Patient is a 82-year-old female presenting today for follow-up of her right ACL reconstruction. No complaints today. Allergies   Allergen Reactions    Amoxicillin Hives       Current Outpatient Medications   Medication Sig    rivaroxaban (XARELTO STARTER JONN) 15 mg (42)- 20 mg (9) DsPk Take one 15 mg tablet twice a day with food for the first 21 days. Then, take one 20 mg tablet once a day with food for 9 days.  naproxen sodium (ALEVE) 220 mg tablet Take 220 mg by mouth two (2) times daily (with meals).  MONTELUKAST SODIUM (SINGULAIR PO) Take  by mouth.  DEXTROAMPHETAMINE/AMPHETAMINE (ADDERALL XR PO) Take 15 mg by mouth daily. No current facility-administered medications for this visit.        Social History     Socioeconomic History    Marital status: SINGLE     Spouse name: Not on file    Number of children: Not on file    Years of education: Not on file    Highest education level: Not on file   Occupational History    Not on file   Tobacco Use    Smoking status: Never Smoker    Smokeless tobacco: Never Used   Substance and Sexual Activity    Alcohol use: Yes     Comment: 2/wine    Drug use: No    Sexual activity: Never     Birth control/protection: Abstinence   Other Topics Concern    Not on file Social History Narrative    Not on file     Social Determinants of Health     Financial Resource Strain:     Difficulty of Paying Living Expenses: Not on file   Food Insecurity:     Worried About Running Out of Food in the Last Year: Not on file    [place] of Food in the Last Year: Not on file   Transportation Needs:     Lack of Transportation (Medical): Not on file    Lack of Transportation (Non-Medical):  Not on file   Physical Activity:     Days of Exercise per Week: Not on file    Minutes of Exercise per Session: Not on file   Stress:     Feeling of Stress : Not on file   Social Connections:     Frequency of Communication with Friends and Family: Not on file    Frequency of Social Gatherings with Friends and Family: Not on file    Attends Christian Services: Not on file    Active Member of Clubs or Organizations: Not on file    Attends Club or Organization Meetings: Not on file    Marital Status: Not on file   Intimate Partner Violence:     Fear of Current or Ex-Partner: Not on file    Emotionally Abused: Not on file    Physically Abused: Not on file    Sexually Abused: Not on file   Housing Stability:     Unable to Pay for Housing in the Last Year: Not on file    Number of [place] in the Last Year: Not on file    Unstable Housing in the Last Year: Not on file       Past Surgical History:   Procedure Laterality Date    HX ACL RECONSTRUCTION Right 2021    HX KNEE ARTHROSCOPY         Family History   Problem Relation Age of Onset    Hypertension Mother     Thyroid Disease Mother     Headache Mother     Diabetes Maternal Grandmother     Cancer Maternal Grandfather         OB History        0    Para   0    Term   0       0    AB   0    Living   0       SAB   0    IAB   0    Ectopic   0    Molar        Multiple   0    Live Births                       REVIEW OF SYSTEMS:  ROS     Positive for: Musculoskeletal    Last edited by Molly Barron on 2021 11:19 AM. (History)            Vitals:  Ht 5' 6\" (1.676 m)   Wt 154 lb (69.9 kg)   BMI 24.86 kg/m²    Estimated body surface area is 1.8 meters squared as calculated from the following:    Height as of this encounter: 5' 6\" (1.676 m). Weight as of this encounter: 154 lb (69.9 kg). Body mass index is 24.86 kg/m². PHYSICAL EXAM:  General exam: Patient is awake, alert, and oriented x3. Well-appearing. No acute distress. Ambulates with a normal gait    Right knee: Neurovascular and sensory intact. Normal range of motion. Normal stability on exam.  No pain with range of motion. IMAGING:  Previous x-rays of the right knee show normal joint space. No signs of fracture. XR Results (most recent):  No results found for this or any previous visit. MRI Results (most recent):  Results from [place] encounter on 03/31/21    MRI KNEE RT WO CONT    Narrative  EXAM: MRI KNEE RT WO CONT    INDICATION: Right knee pain. COMPARISON: None    TECHNIQUE: Axial T2 fat-saturated and proton density fat-saturated; coronal T1  and proton density fat-saturated; and sagittal T2 fat-saturated, proton density  fat-saturated, and gradient echo MRI of the right knee . CONTRAST: None. FINDINGS: Bone marrow: Moderate bone contusions in the lateral femoral condyle  and posterior, proximal, lateral tibia indicate pivot shift mechanism of injury. No acute fracture, dislocation, or marrow replacing process. Joint fluid: Moderate joint effusion. Moderate synovial thickening. Collateral ligaments and posterior, lateral corner: Mildly thickened proximal  fibular collateral ligament. Remaining lateral collateral ligamentous complex  and popliteus tendon are intact. MCL is intact. Medial meniscus: Intact. Lateral meniscus: Subtle irregularity of the free edge of the junction of the  posterior horn and body. ACL and PCL: Complete rupture of the proximal third of the ACL. Surrounding  edema.  PCL is intact. Tendons: Intact. Muscles: Within normal limits. Patellofemoral alignment: No patellar subluxation/tilt. Trochlear groove is not  hypoplastic. TT-TG distance: 9 mm. Articular cartilage: Intact. No focal osteochondral lesion. Soft tissue mass: None. Impression  1. Complete rupture of the proximal ACL is acute or subacute. 2. Bone contusion pattern of pivot shift mechanism of injury. 3. Mild sprain of the proximal fibular collateral ligament. 4. Irregular free edge of the lateral meniscus. 5. Intact PCL, MCL, medial meniscus, and articular cartilage. No orders of the defined types were placed in this encounter. An electronic signature was used to authenticate this note.   -- Greg Laura, DO

## 2022-03-18 PROBLEM — S83.511D NEW ACL TEAR, RIGHT, SUBSEQUENT ENCOUNTER: Status: ACTIVE | Noted: 2021-10-13

## 2022-03-19 PROBLEM — G43.001 MIGRAINE WITHOUT AURA AND WITH STATUS MIGRAINOSUS, NOT INTRACTABLE: Status: ACTIVE | Noted: 2018-01-05

## 2023-01-20 ENCOUNTER — OFFICE VISIT (OUTPATIENT)
Dept: ORTHOPEDIC SURGERY | Age: 33
End: 2023-01-20
Payer: COMMERCIAL

## 2023-01-20 VITALS — BODY MASS INDEX: 26.68 KG/M2 | WEIGHT: 170 LBS | HEIGHT: 67 IN

## 2023-01-20 DIAGNOSIS — M22.41 CHONDROMALACIA OF RIGHT PATELLA: ICD-10-CM

## 2023-01-20 DIAGNOSIS — S83.281A ACUTE LATERAL MENISCUS TEAR OF RIGHT KNEE, INITIAL ENCOUNTER: ICD-10-CM

## 2023-01-20 DIAGNOSIS — M25.561 RIGHT KNEE PAIN, UNSPECIFIED CHRONICITY: Primary | ICD-10-CM

## 2023-01-20 DIAGNOSIS — Z98.890 STATUS POST ARTHROSCOPY OF KNEE: ICD-10-CM

## 2023-01-20 PROCEDURE — 99214 OFFICE O/P EST MOD 30 MIN: CPT | Performed by: ORTHOPAEDIC SURGERY

## 2023-01-20 RX ORDER — DICLOFENAC SODIUM 75 MG/1
75 TABLET, DELAYED RELEASE ORAL 2 TIMES DAILY
Qty: 60 TABLET | Refills: 3 | Status: SHIPPED | OUTPATIENT
Start: 2023-01-20

## 2023-01-20 NOTE — PROGRESS NOTES
Enrrique Patel (: 1990) is a 29 y.o. female, established patient, here for evaluation of the following chief complaint(s):  Knee Pain       ASSESSMENT/PLAN:  Below is the assessment and plan developed based on review of pertinent history, physical exam, labs, studies, and medications. Findings were discussed with the patient today. We will obtain an MRI which will help us with further treatment planning including the possibility of surgical treatment. Patient will follow up after this MRI is performed. We will try diclofenac prescription for now to see how this helps to calm down her inflammation    1. Right knee pain, unspecified chronicity  -     XR KNEE RT MIN 4 V; Future  2. Status post arthroscopy of knee  3. Chondromalacia of right patella  4. Acute lateral meniscus tear of right knee, initial encounter      Return for imaging results after study performed. SUBJECTIVE/OBJECTIVE:  Enrrique Patel (: 1990) is a 29 y.o. female. She presents today with right knee pain and swelling. She notes aching pain that has been consistent over the last year. She is almost 2 years out from an ACL reconstruction. She notes that she has had difficulty returning to her normal activities. She has spent months working on home exercises and therapy exercises. She takes occasional anti-inflammatories with minimal relief. She notes occasional clicking and catching in the knee. Allergies   Allergen Reactions    Amoxicillin Hives       Current Outpatient Medications   Medication Sig    MONTELUKAST SODIUM (SINGULAIR PO) Take  by mouth. DEXTROAMPHETAMINE/AMPHETAMINE (ADDERALL XR PO) Take 15 mg by mouth daily. rivaroxaban (XARELTO STARTER JONN) 15 mg (42)- 20 mg (9) DsPk Take one 15 mg tablet twice a day with food for the first 21 days. Then, take one 20 mg tablet once a day with food for 9 days.  (Patient not taking: Reported on 2023)    naproxen sodium (ALEVE) 220 mg tablet Take 220 mg by mouth two (2) times daily (with meals). No current facility-administered medications for this visit. Social History     Socioeconomic History    Marital status: SINGLE     Spouse name: Not on file    Number of children: Not on file    Years of education: Not on file    Highest education level: Not on file   Occupational History    Not on file   Tobacco Use    Smoking status: Never    Smokeless tobacco: Never   Substance and Sexual Activity    Alcohol use: Yes     Comment: 2/wine    Drug use: No    Sexual activity: Never     Birth control/protection: Abstinence   Other Topics Concern    Not on file   Social History Narrative    Not on file     Social Determinants of Health     Financial Resource Strain: Not on file   Food Insecurity: Not on file   Transportation Needs: Not on file   Physical Activity: Not on file   Stress: Not on file   Social Connections: Not on file   Intimate Partner Violence: Not on file   Housing Stability: Not on file       Past Surgical History:   Procedure Laterality Date    HX ACL RECONSTRUCTION Right 2021    HX KNEE ARTHROSCOPY         Family History   Problem Relation Age of Onset    Hypertension Mother     Thyroid Disease Mother     Headache Mother     Diabetes Maternal Grandmother     Cancer Maternal Grandfather         OB History          0    Para   0    Term   0       0    AB   0    Living   0         SAB   0    IAB   0    Ectopic   0    Molar        Multiple   0    Live Births                       REVIEW OF SYSTEMS:  ROS    Positive for: Musculoskeletal  Last edited by Fermin Page on 2023  8:27 AM.        Patient denies any recent fever, chills, nausea, vomiting, chest pain, or shortness of breath. Vitals:  Ht 5' 7\" (1.702 m)   Wt 170 lb (77.1 kg)   BMI 26.63 kg/m²    Body mass index is 26.63 kg/m². PHYSICAL EXAM:  General exam: Patient is awake, alert, and oriented x3. Well-appearing. No acute distress.   Ambulates with an antalgic gait. Heart/Lungs:  no respiratory distress, palpable pulses    Right knee: Neurovascular and sensory intact. There is tenderness to palpation in the parapatellar region. There is crepitus with range of motion. No significant effusion noted. There is lateral joint line tenderness to palpation. Gris's maneuver is painful. Slight laxity on Lachman's exam and anterior/posterior drawer testing. No erythema or ecchymosis. IMAGING:  Previous x-rays of the right knee show no signs of acute fracture and normal joint spaces  XR Results (most recent):  No results found for this or any previous visit. Orders Placed This Encounter    XR KNEE RT MIN 4 V     Standing Status:   Future     Standing Expiration Date:   4/20/2023     Order Specific Question:   Is Patient Pregnant? Answer: No              An electronic signature was used to authenticate this note.   -- Shravan Herrera, DO

## 2023-02-01 ENCOUNTER — HOSPITAL ENCOUNTER (OUTPATIENT)
Dept: MRI IMAGING | Age: 33
Discharge: HOME | End: 2023-02-01
Attending: ORTHOPAEDIC SURGERY
Payer: COMMERCIAL

## 2023-02-01 DIAGNOSIS — S83.281A ACUTE LATERAL MENISCUS TEAR OF RIGHT KNEE, INITIAL ENCOUNTER: ICD-10-CM

## 2023-02-01 DIAGNOSIS — Z98.890 STATUS POST ARTHROSCOPY OF KNEE: ICD-10-CM

## 2023-02-01 PROCEDURE — 73721 MRI JNT OF LWR EXTRE W/O DYE: CPT

## 2023-02-06 ENCOUNTER — OFFICE VISIT (OUTPATIENT)
Dept: ORTHOPEDIC SURGERY | Age: 33
End: 2023-02-06
Payer: COMMERCIAL

## 2023-02-06 VITALS — BODY MASS INDEX: 26.68 KG/M2 | WEIGHT: 170 LBS | HEIGHT: 67 IN

## 2023-02-06 DIAGNOSIS — M25.561 RIGHT KNEE PAIN, UNSPECIFIED CHRONICITY: Primary | ICD-10-CM

## 2023-02-06 DIAGNOSIS — M25.861 CYCLOPS LESION OF RIGHT KNEE: ICD-10-CM

## 2023-02-06 PROCEDURE — 99214 OFFICE O/P EST MOD 30 MIN: CPT | Performed by: ORTHOPAEDIC SURGERY

## 2023-02-06 NOTE — LETTER
2/6/2023    Patient: Celina Guthrie   YOB: 1990   Date of Visit: 2/6/2023     Gary Davis MD  [address] [place] [id number]  Via Fax: [phone]    Dear Gary Davis MD,      Thank you for referring Ms. Radha Myers to [facility] for evaluation. My notes for this consultation are attached. If you have questions, please do not hesitate to call me. I look forward to following your patient along with you.       Sincerely,    AYLIEN, DO

## 2023-02-06 NOTE — PROGRESS NOTES
Kae Hong (: 1990) is a 29 y.o. female, established patient, here for evaluation of the following chief complaint(s):  Knee Pain       ASSESSMENT/PLAN:  Below is the assessment and plan developed based on review of pertinent history, physical exam, labs, studies, and medications. Findings were discussed with the patient today. We reviewed her MRI and discussed the cyclops lesion/synovitis doing anterior to her ACL reconstruction. We discussed the possibility of right knee arthroscopy with partial synovectomy. Risks and benefits of surgical treatment were explained. We discussed risks of infection, blood loss, neurovascular injury, anesthesia risks, and risk secondary to patient comorbidities. Risk of continued knee pain/instability was explained. We discussed that implants may need to be used for this procedure. We discussed that there may be need for future surgical procedures. Patient understands the risks of this procedure and will consider this option for the future. She will also continue with anti-inflammatories and continue to try to progress her activities    1. Right knee pain, unspecified chronicity  2. Cyclops lesion of right knee      Return if symptoms worsen or fail to improve. SUBJECTIVE/OBJECTIVE:  Kae Hong (: 1990) is a 29 y.o. female. She presents today for follow-up of her recent right knee MRI. She notes continued difficulties returning to her normal active lifestyle. She describes occasional clicking and catching in the knee. Allergies   Allergen Reactions    Amoxicillin Hives       Current Outpatient Medications   Medication Sig    diclofenac EC (VOLTAREN) 75 mg EC tablet Take 1 Tablet by mouth two (2) times a day. rivaroxaban (XARELTO STARTER JONN) 15 mg (42)- 20 mg (9) DsPk Take one 15 mg tablet twice a day with food for the first 21 days. Then, take one 20 mg tablet once a day with food for 9 days.  (Patient not taking: Reported on 2023)    naproxen sodium (ALEVE) 220 mg tablet Take 220 mg by mouth two (2) times daily (with meals). MONTELUKAST SODIUM (SINGULAIR PO) Take  by mouth. DEXTROAMPHETAMINE/AMPHETAMINE (ADDERALL XR PO) Take 15 mg by mouth daily. No current facility-administered medications for this visit. Social History     Socioeconomic History    Marital status: SINGLE     Spouse name: Not on file    Number of children: Not on file    Years of education: Not on file    Highest education level: Not on file   Occupational History    Not on file   Tobacco Use    Smoking status: Never    Smokeless tobacco: Never   Substance and Sexual Activity    Alcohol use: Yes     Comment: 2/wine    Drug use: No    Sexual activity: Never     Birth control/protection: Abstinence   Other Topics Concern    Not on file   Social History Narrative    Not on file     Social Determinants of Health     Financial Resource Strain: Not on file   Food Insecurity: Not on file   Transportation Needs: Not on file   Physical Activity: Not on file   Stress: Not on file   Social Connections: Not on file   Intimate Partner Violence: Not on file   Housing Stability: Not on file       Past Surgical History:   Procedure Laterality Date    HX ACL RECONSTRUCTION Right 2021    HX KNEE ARTHROSCOPY         Family History   Problem Relation Age of Onset    Hypertension Mother     Thyroid Disease Mother     Headache Mother     Diabetes Maternal Grandmother     Cancer Maternal Grandfather         OB History          0    Para   0    Term   0       0    AB   0    Living   0         SAB   0    IAB   0    Ectopic   0    Molar        Multiple   0    Live Births                       REVIEW OF SYSTEMS:  ROS    Positive for: Musculoskeletal  Last edited by Reyes Field on 2023 10:18 AM.        Patient denies any recent fever, chills, nausea, vomiting, chest pain, or shortness of breath.       Vitals:  Ht 5' 7\" (1.702 m)   Wt 170 lb (77.1 kg)   BMI 26.63 kg/m²    Body mass index is 26.63 kg/m². PHYSICAL EXAM:  General exam: Patient is awake, alert, and oriented x3. Well-appearing. No acute distress. Ambulates with an antalgic gait. Heart/Lungs:  no respiratory distress, palpable pulses    Right knee: Neurovascular and sensory intact. There is tenderness to palpation in the parapatellar region. There is mild crepitus with range of motion. No significant effusion noted. There is no joint line tenderness to palpation. Gris's maneuver is nonpainful. Normal stability on Lachman's exam and anterior/posterior drawer testing. No erythema or ecchymosis. IMAGING:  MRI Results (most recent):  Results from Hospital Encounter encounter on 02/01/23    MRI KNEE RT WO CONT    Narrative  EXAM: MRI KNEE RT WO CONT    INDICATION: Pain    COMPARISON: MRI 3/31/2021    TECHNIQUE: Axial T2 fat-saturated and proton density fat-saturated; coronal T1  and proton density fat-saturated; and sagittal T2 fat-saturated, proton density  fat-saturated, and gradient echo MRI of the right knee . CONTRAST: None. FINDINGS: Bone marrow: Surgical artifacts related to distal femoral and proximal  tibial tunnels for ACL reconstruction. Small focus of subchondral reactive bone  signal is shown posterolaterally in the lateral tibial condyle. No acute  fracture, dislocation, or marrow replacing process. Joint fluid: Knee joint fluid volume within normal limits. Trace fluid also  noted in gastrocnemius semimembranosus bursa. Collateral ligaments and posterior, lateral corner: Intact. Medial meniscus: Intact. Lateral meniscus: Intact. ACL and PCL: ACL graft intact. Prominent cyclops lesion measuring approximately  23 mm AP, 19 mm transverse and 22 mm craniocaudal. PCL intact. Tendons: Intact. Muscles: Within normal limits. Patellofemoral alignment: No patellar subluxation/tilt. Trochlear groove is not  hypoplastic.  TT-TG distance: Normal.    Articular cartilage: No osteochondral derangement demonstrated. There is  probably a small area of intermediate grade chondral derangement  posterolaterally in the lateral tibial plateau. Soft tissue mass: Other than above, there is edema and small cystlike foci  within the lateral portion of the anterior suprapatellar fat pads, consistent  with fat pad impingement. .    Impression  1. ACL reconstruction with intact appearance. 2. Prominent cyclops lesion. 3. Edema and small cystlike foci in anterior suprapatellar fat pad suggesting  fat pad impingement. XR Results (most recent):  No results found for this or any previous visit. No orders of the defined types were placed in this encounter. An electronic signature was used to authenticate this note.   -- Tanna Oro, DO

## 2023-02-06 NOTE — PATIENT INSTRUCTIONS
What to expect after Knee Arthroscopy   Dr. Tonya Brush (my medical assistant) TO SCHEDULE SURGERY OR WITH QUESTIONS. HER DIRECT NUMBER [phone]    You should not have ANYTHING to eat or drink after midnight the night before your surgery. This includes NO gum, mints, candy, lifesavers or lollipops! Please make sure to remove ALL jewelry. When you arrive at the hospital or surgery center, you will be checked in and given an IV. When you wake up, your knee will have pain medicine inside of it. This will provide you with pain relief for the first day. Even though your knee feels good, DO NOT over exert yourself during this period!!! You will regret it as it will hurt worse when the numbing medicine wears off! You should not need crutches, but if you feel they are necessary, ask for them before you leave the hospital or surgery center. During first three days, you should RELAX, ice and elevate the knee. You may do some walking, but keep it to a minimum! The pain is usually the worst when the pain medicine wears off, and then gradually subsides after that. Numbness, tingling, soreness and bruising are all normal.  Your knee may also be warm to touch for the first few days. You may also experience swelling in the leg, ankle and foot. This is normal.  I recommend ice and elevation  You will likely continue to have pain for several weeks or even months. Recovery from knee surgery could take several months. BE PATIENT! All you need to do for the first two weeks is to slowly increase your walking. An exception to the above: Meniscus Repairs (where we are putting stitches in the meniscus to repair, this is not as common as a cleanup/trimming of the meniscus)  Patients with meniscus repairs will be placed in a hinged knee brace that is locked to keep the leg straight. You may weight bear in this brace.   At your first follow up visit, you will have your sutures removed    You may be given a script for physical therapy depending on the extent of your surgery  You will be in therapy for about 4 weeks - 6 weeks or as needed  Driving: If you had surgery on your  LEFT knee, you can begin driving when you are no longer taking narcotic pain medications. If you had surgery on your RIGHT knee, you should figure on starting to drive when the knee is strong enough to press the brake in an emergency and you are off pain medicines.   If you had a meniscal repair, this will be 6 weeks post-op    Your restrictions will be as follows:  NO lifting / carrying / pushing / pulling greater than 10 lbs for 2 weeks  NO kneeling / crawling / climbing for 2 weeks

## 2023-02-26 ENCOUNTER — HOSPITAL ENCOUNTER (EMERGENCY)
Age: 33
Discharge: HOME | End: 2023-02-26
Attending: EMERGENCY MEDICINE
Payer: COMMERCIAL

## 2023-02-26 ENCOUNTER — APPOINTMENT (OUTPATIENT)
Dept: CT IMAGING | Age: 33
End: 2023-02-26
Attending: NURSE PRACTITIONER
Payer: COMMERCIAL

## 2023-02-26 VITALS
TEMPERATURE: 98.6 F | DIASTOLIC BLOOD PRESSURE: 70 MMHG | RESPIRATION RATE: 18 BRPM | SYSTOLIC BLOOD PRESSURE: 100 MMHG | BODY MASS INDEX: 25.81 KG/M2 | OXYGEN SATURATION: 97 % | HEART RATE: 85 BPM | HEIGHT: 67 IN | WEIGHT: 164.46 LBS

## 2023-02-26 DIAGNOSIS — E86.0 DEHYDRATION: ICD-10-CM

## 2023-02-26 DIAGNOSIS — D64.9 ANEMIA, UNSPECIFIED TYPE: ICD-10-CM

## 2023-02-26 DIAGNOSIS — R10.84 ABDOMINAL PAIN, GENERALIZED: ICD-10-CM

## 2023-02-26 DIAGNOSIS — R11.2 NAUSEA AND VOMITING, UNSPECIFIED VOMITING TYPE: Primary | ICD-10-CM

## 2023-02-26 DIAGNOSIS — R80.9 PROTEINURIA, UNSPECIFIED TYPE: ICD-10-CM

## 2023-02-26 LAB
ALBUMIN SERPL-MCNC: 4.1 G/DL (ref 3.5–5)
ALBUMIN/GLOB SERPL: 1.1 (ref 1.1–2.2)
ALP SERPL-CCNC: 84 U/L (ref 45–117)
ALT SERPL-CCNC: 17 U/L (ref 12–78)
ANION GAP SERPL CALC-SCNC: 11 MMOL/L (ref 5–15)
APPEARANCE UR: CLEAR
AST SERPL-CCNC: 21 U/L (ref 15–37)
BACTERIA URNS QL MICRO: NEGATIVE /HPF
BASOPHILS # BLD: 0 K/UL (ref 0–0.1)
BASOPHILS NFR BLD: 0 % (ref 0–1)
BILIRUB SERPL-MCNC: 0.7 MG/DL (ref 0.2–1)
BILIRUB UR QL: NEGATIVE
BUN SERPL-MCNC: 11 MG/DL (ref 6–20)
BUN/CREAT SERPL: 13 (ref 12–20)
CALCIUM SERPL-MCNC: 8.6 MG/DL (ref 8.5–10.1)
CHLORIDE SERPL-SCNC: 104 MMOL/L (ref 97–108)
CO2 SERPL-SCNC: 25 MMOL/L (ref 21–32)
COLOR UR: YELLOW
CREAT SERPL-MCNC: 0.83 MG/DL (ref 0.55–1.02)
DIFFERENTIAL METHOD BLD: ABNORMAL
EOSINOPHIL # BLD: 0 K/UL (ref 0–0.4)
EOSINOPHIL NFR BLD: 0 % (ref 0–7)
EPITH CASTS URNS QL MICRO: ABNORMAL /LPF
ERYTHROCYTE [DISTWIDTH] IN BLOOD BY AUTOMATED COUNT: 20.2 % (ref 11.5–14.5)
GLOBULIN SER CALC-MCNC: 3.7 G/DL (ref 2–4)
GLUCOSE SERPL-MCNC: 101 MG/DL (ref 65–100)
GLUCOSE UR STRIP.AUTO-MCNC: NEGATIVE MG/DL
HCG UR QL: NEGATIVE
HCT VFR BLD AUTO: 29.5 % (ref 35–47)
HGB BLD-MCNC: 8.2 G/DL (ref 11.5–16)
HGB UR QL STRIP: NEGATIVE
IMM GRANULOCYTES # BLD AUTO: 0.1 K/UL (ref 0–0.04)
IMM GRANULOCYTES NFR BLD AUTO: 1 % (ref 0–0.5)
KETONES UR QL STRIP.AUTO: >80 MG/DL
LEUKOCYTE ESTERASE UR QL STRIP.AUTO: NEGATIVE
LIPASE SERPL-CCNC: 77 U/L (ref 73–393)
LYMPHOCYTES # BLD: 0.3 K/UL (ref 0.8–3.5)
LYMPHOCYTES NFR BLD: 6 % (ref 12–49)
MCH RBC QN AUTO: 17.6 PG (ref 26–34)
MCHC RBC AUTO-ENTMCNC: 27.8 G/DL (ref 30–36.5)
MCV RBC AUTO: 63.3 FL (ref 80–99)
MONOCYTES # BLD: 0.3 K/UL (ref 0–1)
MONOCYTES NFR BLD: 6 % (ref 5–13)
MUCOUS THREADS URNS QL MICRO: ABNORMAL /LPF
NEUTS SEG # BLD: 4.7 K/UL (ref 1.8–8)
NEUTS SEG NFR BLD: 87 % (ref 32–75)
NITRITE UR QL STRIP.AUTO: NEGATIVE
NRBC # BLD: 0 K/UL (ref 0–0.01)
NRBC BLD-RTO: 0 PER 100 WBC
PH UR STRIP: 7 (ref 5–8)
PLATELET # BLD AUTO: 341 K/UL (ref 150–400)
PMV BLD AUTO: 10.1 FL (ref 8.9–12.9)
POTASSIUM SERPL-SCNC: 3.4 MMOL/L (ref 3.5–5.1)
PROT SERPL-MCNC: 7.8 G/DL (ref 6.4–8.2)
PROT UR STRIP-MCNC: 30 MG/DL
RBC # BLD AUTO: 4.66 M/UL (ref 3.8–5.2)
RBC #/AREA URNS HPF: ABNORMAL /HPF (ref 0–5)
RBC MORPH BLD: ABNORMAL
SODIUM SERPL-SCNC: 140 MMOL/L (ref 136–145)
SP GR UR REFRACTOMETRY: 1.02 (ref 1–1.03)
UR CULT HOLD, URHOLD: NORMAL
UROBILINOGEN UR QL STRIP.AUTO: 1 EU/DL (ref 0.2–1)
WBC # BLD AUTO: 5.4 K/UL (ref 3.6–11)
WBC URNS QL MICRO: ABNORMAL /HPF (ref 0–4)

## 2023-02-26 PROCEDURE — 81001 URINALYSIS AUTO W/SCOPE: CPT

## 2023-02-26 PROCEDURE — 85025 COMPLETE CBC W/AUTO DIFF WBC: CPT

## 2023-02-26 PROCEDURE — 96361 HYDRATE IV INFUSION ADD-ON: CPT

## 2023-02-26 PROCEDURE — 96375 TX/PRO/DX INJ NEW DRUG ADDON: CPT

## 2023-02-26 PROCEDURE — 74011250636 HC RX REV CODE- 250/636: Performed by: NURSE PRACTITIONER

## 2023-02-26 PROCEDURE — 96374 THER/PROPH/DIAG INJ IV PUSH: CPT

## 2023-02-26 PROCEDURE — 74176 CT ABD & PELVIS W/O CONTRAST: CPT

## 2023-02-26 PROCEDURE — 74011250637 HC RX REV CODE- 250/637: Performed by: NURSE PRACTITIONER

## 2023-02-26 PROCEDURE — 74011250636 HC RX REV CODE- 250/636: Performed by: EMERGENCY MEDICINE

## 2023-02-26 PROCEDURE — 99284 EMERGENCY DEPT VISIT MOD MDM: CPT

## 2023-02-26 PROCEDURE — 80053 COMPREHEN METABOLIC PANEL: CPT

## 2023-02-26 PROCEDURE — 81025 URINE PREGNANCY TEST: CPT

## 2023-02-26 PROCEDURE — 83690 ASSAY OF LIPASE: CPT

## 2023-02-26 PROCEDURE — 36415 COLL VENOUS BLD VENIPUNCTURE: CPT

## 2023-02-26 RX ORDER — ONDANSETRON 4 MG/1
4 TABLET, ORALLY DISINTEGRATING ORAL
Qty: 10 TABLET | Refills: 0 | Status: SHIPPED | OUTPATIENT
Start: 2023-02-26

## 2023-02-26 RX ORDER — ONDANSETRON 2 MG/ML
4 INJECTION INTRAMUSCULAR; INTRAVENOUS
Status: COMPLETED | OUTPATIENT
Start: 2023-02-26 | End: 2023-02-26

## 2023-02-26 RX ORDER — ACETAMINOPHEN 500 MG
1000 TABLET ORAL
Status: COMPLETED | OUTPATIENT
Start: 2023-02-26 | End: 2023-02-26

## 2023-02-26 RX ORDER — SODIUM CHLORIDE 9 MG/ML
1000 INJECTION, SOLUTION INTRAVENOUS ONCE
Status: COMPLETED | OUTPATIENT
Start: 2023-02-26 | End: 2023-02-26

## 2023-02-26 RX ORDER — KETOROLAC TROMETHAMINE 30 MG/ML
15 INJECTION, SOLUTION INTRAMUSCULAR; INTRAVENOUS
Status: COMPLETED | OUTPATIENT
Start: 2023-02-26 | End: 2023-02-26

## 2023-02-26 RX ORDER — DICYCLOMINE HYDROCHLORIDE 20 MG/1
20 TABLET ORAL EVERY 6 HOURS
Qty: 16 TABLET | Refills: 0 | Status: SHIPPED | OUTPATIENT
Start: 2023-02-26 | End: 2023-03-02

## 2023-02-26 RX ORDER — METOCLOPRAMIDE HYDROCHLORIDE 5 MG/ML
10 INJECTION INTRAMUSCULAR; INTRAVENOUS
Status: COMPLETED | OUTPATIENT
Start: 2023-02-26 | End: 2023-02-26

## 2023-02-26 RX ADMIN — SODIUM CHLORIDE 1000 ML: 9 INJECTION, SOLUTION INTRAVENOUS at 20:31

## 2023-02-26 RX ADMIN — KETOROLAC TROMETHAMINE 15 MG: 30 INJECTION, SOLUTION INTRAMUSCULAR at 16:31

## 2023-02-26 RX ADMIN — SODIUM CHLORIDE 1000 ML: 9 INJECTION, SOLUTION INTRAVENOUS at 16:31

## 2023-02-26 RX ADMIN — METOCLOPRAMIDE 10 MG: 5 INJECTION, SOLUTION INTRAMUSCULAR; INTRAVENOUS at 20:32

## 2023-02-26 RX ADMIN — ONDANSETRON HYDROCHLORIDE 4 MG: 2 SOLUTION INTRAMUSCULAR; INTRAVENOUS at 13:42

## 2023-02-26 RX ADMIN — ACETAMINOPHEN 1000 MG: 500 TABLET ORAL at 20:33

## 2023-02-26 RX ADMIN — SODIUM CHLORIDE 1000 ML: 9 INJECTION, SOLUTION INTRAVENOUS at 17:59

## 2023-02-26 RX ADMIN — SODIUM CHLORIDE 1000 ML: 9 INJECTION, SOLUTION INTRAVENOUS at 13:41

## 2023-02-26 NOTE — ED PROVIDER NOTES
27-year-old female with past medical history of asthma and migraines presents ambulatory to the ER for evaluation of nausea vomiting and generalized fatigue that started yesterday. Patient denies known sick contacts. Patient denies fever, chills, vision change, chest pain, shortness of breath, diarrhea, difficulty urinating or dysuria. Denies seeing blood in urine or stool. Patient denies tobacco, alcohol or illicit drug use. Past Medical History:   Diagnosis Date    Asthma     Common migraine 4/14       Past Surgical History:   Procedure Laterality Date    HX ACL RECONSTRUCTION Right 04/29/2021    HX KNEE ARTHROSCOPY           Family History:   Problem Relation Age of Onset    Hypertension Mother     Thyroid Disease Mother     Headache Mother     Diabetes Maternal Grandmother     Cancer Maternal Grandfather        Social History     Socioeconomic History    Marital status: SINGLE     Spouse name: Not on file    Number of children: Not on file    Years of education: Not on file    Highest education level: Not on file   Occupational History    Not on file   Tobacco Use    Smoking status: Never    Smokeless tobacco: Never   Vaping Use    Vaping Use: Not on file   Substance and Sexual Activity    Alcohol use: Yes     Comment: 2/wine    Drug use: No    Sexual activity: Never     Birth control/protection: Abstinence   Other Topics Concern    Not on file   Social History Narrative    Not on file     Social Determinants of Health     Financial Resource Strain: Not on file   Food Insecurity: Not on file   Transportation Needs: Not on file   Physical Activity: Not on file   Stress: Not on file   Social Connections: Not on file   Intimate Partner Violence: Not on file   Housing Stability: Not on file         ALLERGIES: Amoxicillin    Review of Systems   Constitutional:  Positive for appetite change. Negative for chills and fever. Respiratory:  Negative for cough and shortness of breath.     Cardiovascular:  Negative for chest pain. Gastrointestinal:  Positive for abdominal pain, nausea and vomiting. Negative for blood in stool, constipation and diarrhea. Genitourinary:  Negative for difficulty urinating, dysuria, hematuria and vaginal bleeding. Neurological:  Positive for headaches. Negative for dizziness and light-headedness. Vitals:    02/26/23 1945 02/26/23 2030 02/26/23 2100 02/26/23 2130   BP: 104/72 92/67 104/69 99/71   Pulse:       Resp:       Temp:       SpO2: 100% 100% 97% 97%   Weight:       Height:                Physical Exam  Vitals and nursing note reviewed. Constitutional:       General: She is not in acute distress. Appearance: Normal appearance. She is well-developed and normal weight. She is not ill-appearing. HENT:      Head: Normocephalic. Nose: Nose normal.      Mouth/Throat:      Mouth: Mucous membranes are dry. Eyes:      Pupils: Pupils are equal, round, and reactive to light. Cardiovascular:      Rate and Rhythm: Tachycardia present. Pulses: Normal pulses. Pulmonary:      Effort: Pulmonary effort is normal. No respiratory distress. Breath sounds: Normal breath sounds. No wheezing. Abdominal:      General: Bowel sounds are normal. There is no distension. Palpations: Abdomen is soft. Tenderness: There is generalized abdominal tenderness. There is no right CVA tenderness or left CVA tenderness. Musculoskeletal:         General: Normal range of motion. Cervical back: Normal range of motion. Skin:     General: Skin is warm and dry. Capillary Refill: Capillary refill takes less than 2 seconds. Neurological:      General: No focal deficit present. Mental Status: She is alert and oriented to person, place, and time. Psychiatric:         Mood and Affect: Mood normal.        Medical Decision Making  Differential DX: uti vs dehydration vs gastritis vs SBO vs anemia vs hypotensive    1.  Previous notes (external to Emergency room) were reviewed: none    2. History was obtained by: patient    3. Chronic medical issues affecting this visit:   none    4. I ordered the following tests, followed by review of final reads by lab and radiology: cbc, cmp, lip, urine, ct ab/pel, urine pregnancy    5. I considered ordering: abdomen ultrasound    6. Medical intervention: IVf bolus x 4, 2 rounds of anti-emetic medication, tylenol and Toradol      Surgical intervention: none    7. Social determinants of health: none    8 Final diagnosis: abdominal pain, anemia, dehydration,nausea and vomiting and proteinuria . Medications prescribed: zofran and bentyl    9. Disposition: Patient reassessed after the third liter of fluids, heart rate in the low 100s, blood pressure in the upper 90s, patient tolerating small amount of p.o. intake without further nausea vomiting. Discussed possible admission to the hospital, patient stating that she would prefer to not be admitted. Patient voices that she has a primary care provider she can follow-up with, discussed giving 1 additional liter of fluids, Tylenol for mild headache and attempting further p.o. intake. Will reevaluate. Patient reevaluated after meal and additional fluid bolus, states that she is feeling better, has been up and ambulatory, will provide work note as well as prescription for Zofran and Bentyl as needed. Advised patient to follow-up with PCP and GI as needed. Patient remains cognitively and fully intact, family at the bedside, verbalized understanding agrees with plan. Problems Addressed:  Abdominal pain, generalized: acute illness or injury  Anemia, unspecified type: acute illness or injury  Dehydration: acute illness or injury  Nausea and vomiting, unspecified vomiting type: acute illness or injury  Proteinuria, unspecified type: acute illness or injury    Amount and/or Complexity of Data Reviewed  Labs: ordered. Radiology: ordered. Risk  OTC drugs. Prescription drug management. Procedures    VITAL SIGNS:  Patient Vitals for the past 4 hrs:   BP SpO2   02/26/23 2130 99/71 97 %   02/26/23 2100 104/69 97 %   02/26/23 2030 92/67 100 %   02/26/23 1945 104/72 100 %   02/26/23 1900 (!) 87/64 100 %   02/26/23 1847 (!) 89/67 100 %   02/26/23 1830 100/65 100 %   02/26/23 1815 96/66 100 %   02/26/23 1747 102/65 96 %           LABS:  The Following labs have been ordered while in the emergency department and I have independently evaluated them. Recent Results (from the past 6 hour(s))   HCG URINE, QL. - POC    Collection Time: 02/26/23  7:10 PM   Result Value Ref Range    Pregnancy test,urine (POC) Negative NEG            IMAGING:  The Following imaging studies have been ordered while in the emergency department and I have reviewed them. CT ABD PELV WO CONT   Final Result   No renal calculi or evidence of obstructive uropathy. Medications During Visit:  I ordered/approved the ordering of the following medicines for the patient while in the emergency department. Medications   sodium chloride 0.9 % bolus infusion 1,000 mL (0 mL IntraVENous IV Completed 2/26/23 1611)   ondansetron (ZOFRAN) injection 4 mg (4 mg IntraVENous Given 2/26/23 1342)   ketorolac (TORADOL) injection 15 mg (15 mg IntraVENous Given 2/26/23 1631)   0.9% sodium chloride infusion 1,000 mL (0 mL IntraVENous IV Completed 2/26/23 1723)   sodium chloride 0.9 % bolus infusion 1,000 mL (0 mL IntraVENous IV Completed 2/26/23 1917)   acetaminophen (TYLENOL) tablet 1,000 mg (1,000 mg Oral Given 2/26/23 2033)   0.9% sodium chloride infusion 1,000 mL (1,000 mL IntraVENous New Bag 2/26/23 2031)   metoclopramide HCl (REGLAN) injection 10 mg (10 mg IntraVENous Given 2/26/23 2032)       IMPRESSION:  1. Nausea and vomiting, unspecified vomiting type    2. Abdominal pain, generalized    3. Anemia, unspecified type    4. Dehydration    5.  Proteinuria, unspecified type        DISPOSITION:  Discharged      Current Discharge Medication List        START taking these medications    Details   ondansetron (ZOFRAN ODT) 4 mg disintegrating tablet Take 1 Tablet by mouth every eight (8) hours as needed for Nausea or Vomiting for up to 10 doses. Qty: 10 Tablet, Refills: 0  Start date: 2/26/2023      dicyclomine (BENTYL) 20 mg tablet Take 1 Tablet by mouth every six (6) hours for 4 days. Qty: 16 Tablet, Refills: 0  Start date: 2/26/2023, End date: 3/2/2023              Follow-up Information       Follow up With Specialties Details Why Contact Info    Berdine Litten, MD General Practice Schedule an appointment as soon as possible for a visit in 3 days  [address] [facility] [place]  [phone]      [facility] [address] [place] [address] Emergency Medicine  If symptoms worsen Nar[address] [address]  [phone]    Sunitha Back MD Gastroenterology Schedule an appointment as soon as possible for a visit  [facility]enter[facility]-, As needed [facility] [address] [phone]                The patient is asked to follow-up with their primary care provider in the next several days. They are to call tomorrow for an appointment. The patient is asked to return promptly for any increased concerns or worsening of symptoms. They can return to this emergency department or any other emergency department.     Tori Capellan NP  9:37 PM

## 2023-02-26 NOTE — Clinical Note
[facility]  [address]  [address]  [phone]    Work/School Note    Date: 2/26/2023    To Whom It May concern:    Janell Mcintyre was seen and treated today in the emergency room by the following provider(s):  Attending Provider: Lasha Murray MD  Nurse Practitioner: Junaid Petty, Ana Muro NP. Janell Mcintyre is excused from work/school on 02/26/23 and 02/27/23. She is medically clear to return to work/school on 2/28/2023.        Sincerely,          Juan Limon NP

## 2023-02-26 NOTE — DISCHARGE INSTRUCTIONS
Increase your oral fluids as tolerated, consider adding Gatorade, Body [place] or Pedialyte. Please follow up with your Primary Care doctor as your blood work is showing that you are anemic but your levels are stable at this time. Advance your diet as tolerated, use the Zofran as needed for nausea vomiting.

## 2023-02-26 NOTE — ED TRIAGE NOTES
Presented to the ED with abdominal discomfort and pain for 24 hours. Had the stomach virus a week ago but began vomiting this morning. Generalized weakness with abdominal cramping.

## 2023-02-26 NOTE — Clinical Note
[facility] EMERGENCY [facility]  [address]  [address]  [phone]    Work/School Note    Date: 2/26/2023    To Whom It May concern:    Shadia Giordano was seen and treated today in the emergency room by the following provider(s):  Attending Provider: Angel Taylor MD  Nurse Practitioner: Mady Perez NP. Shadia Giordano is excused from work/school on 02/26/23 and 02/27/23. She is medically clear to return to work/school on 2/28/2023.        Sincerely,          Isiah Diaz NP

## 2023-02-27 NOTE — ROUTINE PROCESS
Emergency Room Nursing Note        Patient Name: Roxy Rudd      : 1990             MRN: [id number]      Chief Complaint: Abdominal Pain (Presented to the ED with abdominal discomfort and pain for 24 hours. Had the stomach virus a week ago but began vomiting this morning. Generalized weakness with abdominal cramping. ), Vomiting, and Fatigue      Admit Diagnosis: No admission diagnoses are documented for this encounter. Surgery: * No surgery found *            MD/RN reviewed discharge instructions and options with patient. Patient verbalized understanding. RN reviewed discharge instructions using teach back method. Patient ambulatory to exit without difficulty and no acute signs of distress. No complaints or needs expressed at this time. Patient counseled on medications prescribed at discharge (If prescribed). Vital signs stable. Patient to follow up with PCP/Specialist on the next business day for appointment. All questions answered by ER RN.           Lines:        Vitals: Patient Vitals for the past 12 hrs:   Temp Pulse Resp BP SpO2   238 98.6 °F (37 °C) 85 18 100/70 97 %   23 -- -- -- 99/71 97 %   23 2100 -- -- -- 104/69 97 %   23 2030 -- -- -- 92/67 100 %   23 1945 -- -- -- 104/72 100 %   23 1900 -- -- -- (!) 87/64 100 %   23 1847 -- -- -- (!) 89/67 100 %   23 1830 -- -- -- 100/65 100 %   23 1815 -- -- -- 96/66 100 %   23 1747 -- -- -- 102/65 96 %   23 1730 -- -- -- 98/61 96 %   23 1723 -- 92 -- -- --   23 1715 -- -- -- 97/65 96 %   23 1615 -- -- -- 93/67 98 %   23 1504 -- -- -- -- 100 %   23 1430 -- -- -- 104/70 96 %   23 1324 98.8 °F (37.1 °C) (!) 101 20 104/73 99 %         Signed by: Jordan Ann RN, NIKO, BSN, CMSRN                                              2023 at 9:46 PM

## 2023-05-21 RX ORDER — ONDANSETRON 4 MG/1
4 TABLET, ORALLY DISINTEGRATING ORAL EVERY 8 HOURS PRN
COMMUNITY
Start: 2023-02-26

## 2023-05-21 RX ORDER — DICLOFENAC SODIUM 75 MG/1
75 TABLET, DELAYED RELEASE ORAL 2 TIMES DAILY
COMMUNITY
Start: 2023-01-20

## 2023-11-14 ENCOUNTER — HOSPITAL ENCOUNTER (OUTPATIENT)
Facility: HOSPITAL | Age: 33
Discharge: HOME | End: 2023-11-17
Payer: COMMERCIAL

## 2023-11-14 DIAGNOSIS — R63.0 POOR APPETITE: ICD-10-CM

## 2023-11-14 DIAGNOSIS — D64.9 ANEMIA, UNSPECIFIED TYPE: ICD-10-CM

## 2023-11-14 PROCEDURE — 78264 GASTRIC EMPTYING IMG STUDY: CPT

## 2023-11-14 PROCEDURE — A9541 TC99M SULFUR COLLOID: HCPCS | Performed by: PHYSICIAN ASSISTANT

## 2023-11-14 PROCEDURE — 3430000000 HC RX DIAGNOSTIC RADIOPHARMACEUTICAL: Performed by: PHYSICIAN ASSISTANT

## 2023-11-14 RX ORDER — TECHNETIUM TC 99M SULFUR COLLOID 2 MG
1 KIT MISCELLANEOUS ONCE
Status: COMPLETED | OUTPATIENT
Start: 2023-11-14 | End: 2023-11-14

## 2023-11-14 RX ADMIN — TECHNETIUM TC 99M SULFUR COLLOID 1 MILLICURIE: KIT at 10:05
